# Patient Record
Sex: FEMALE | Race: WHITE | NOT HISPANIC OR LATINO | Employment: FULL TIME | URBAN - NONMETROPOLITAN AREA
[De-identification: names, ages, dates, MRNs, and addresses within clinical notes are randomized per-mention and may not be internally consistent; named-entity substitution may affect disease eponyms.]

---

## 2018-05-22 ENCOUNTER — TRANSCRIBE ORDERS (OUTPATIENT)
Dept: ADMINISTRATIVE | Facility: HOSPITAL | Age: 38
End: 2018-05-22

## 2018-05-22 ENCOUNTER — HOSPITAL ENCOUNTER (OUTPATIENT)
Dept: GENERAL RADIOLOGY | Facility: HOSPITAL | Age: 38
Discharge: HOME | End: 2018-05-22
Admitting: PHYSICIAN ASSISTANT

## 2018-05-22 DIAGNOSIS — M41.9 SCOLIOSIS, UNSPECIFIED SCOLIOSIS TYPE, UNSPECIFIED SPINAL REGION: Primary | ICD-10-CM

## 2018-05-22 PROCEDURE — 72081 X-RAY EXAM ENTIRE SPI 1 VW: CPT

## 2019-12-12 ENCOUNTER — TRANSCRIBE ORDERS (OUTPATIENT)
Dept: ADMINISTRATIVE | Facility: HOSPITAL | Age: 39
End: 2019-12-12

## 2019-12-12 DIAGNOSIS — M54.12 CERVICAL RADICULOPATHY: ICD-10-CM

## 2019-12-12 DIAGNOSIS — G89.29 CHRONIC NECK PAIN: Primary | ICD-10-CM

## 2019-12-12 DIAGNOSIS — M54.2 CHRONIC NECK PAIN: Primary | ICD-10-CM

## 2019-12-26 ENCOUNTER — HOSPITAL ENCOUNTER (OUTPATIENT)
Dept: MRI IMAGING | Facility: HOSPITAL | Age: 39
Discharge: HOME | End: 2019-12-26
Admitting: PHYSICIAN ASSISTANT

## 2019-12-26 DIAGNOSIS — G89.29 CHRONIC NECK PAIN: ICD-10-CM

## 2019-12-26 DIAGNOSIS — M54.12 CERVICAL RADICULOPATHY: ICD-10-CM

## 2019-12-26 DIAGNOSIS — M54.2 CHRONIC NECK PAIN: ICD-10-CM

## 2019-12-26 PROCEDURE — 72141 MRI NECK SPINE W/O DYE: CPT

## 2021-08-17 ENCOUNTER — LAB (OUTPATIENT)
Dept: LAB | Facility: HOSPITAL | Age: 41
End: 2021-08-17

## 2021-08-17 ENCOUNTER — OFFICE VISIT (OUTPATIENT)
Dept: GASTROENTEROLOGY | Facility: CLINIC | Age: 41
End: 2021-08-17

## 2021-08-17 VITALS
HEIGHT: 65 IN | BODY MASS INDEX: 24.83 KG/M2 | TEMPERATURE: 98.3 F | DIASTOLIC BLOOD PRESSURE: 68 MMHG | HEART RATE: 75 BPM | RESPIRATION RATE: 16 BRPM | SYSTOLIC BLOOD PRESSURE: 106 MMHG | WEIGHT: 149 LBS

## 2021-08-17 DIAGNOSIS — R11.0 NAUSEA: Chronic | ICD-10-CM

## 2021-08-17 DIAGNOSIS — K92.1 MELENA: Chronic | ICD-10-CM

## 2021-08-17 DIAGNOSIS — R10.13 EPIGASTRIC PAIN: Chronic | ICD-10-CM

## 2021-08-17 DIAGNOSIS — K21.9 GASTROESOPHAGEAL REFLUX DISEASE, UNSPECIFIED WHETHER ESOPHAGITIS PRESENT: Chronic | ICD-10-CM

## 2021-08-17 DIAGNOSIS — R19.7 DIARRHEA, UNSPECIFIED TYPE: Chronic | ICD-10-CM

## 2021-08-17 DIAGNOSIS — R10.30 LOWER ABDOMINAL PAIN: Chronic | ICD-10-CM

## 2021-08-17 DIAGNOSIS — R10.13 EPIGASTRIC PAIN: Primary | Chronic | ICD-10-CM

## 2021-08-17 PROBLEM — R10.9 ABDOMINAL PAIN: Status: ACTIVE | Noted: 2021-08-17

## 2021-08-17 PROBLEM — M41.9 CERVICAL SCOLIOSIS: Status: ACTIVE | Noted: 2021-08-17

## 2021-08-17 LAB
ALBUMIN SERPL-MCNC: 4.3 G/DL (ref 3.5–5.2)
ALBUMIN/GLOB SERPL: 1.5 G/DL
ALP SERPL-CCNC: 51 U/L (ref 39–117)
ALT SERPL W P-5'-P-CCNC: 11 U/L (ref 1–33)
ANION GAP SERPL CALCULATED.3IONS-SCNC: 8.8 MMOL/L (ref 5–15)
AST SERPL-CCNC: 12 U/L (ref 1–32)
BILIRUB SERPL-MCNC: 0.8 MG/DL (ref 0–1.2)
BUN SERPL-MCNC: 9 MG/DL (ref 6–20)
BUN/CREAT SERPL: 9 (ref 7–25)
CALCIUM SPEC-SCNC: 9.1 MG/DL (ref 8.6–10.5)
CHLORIDE SERPL-SCNC: 104 MMOL/L (ref 98–107)
CO2 SERPL-SCNC: 24.2 MMOL/L (ref 22–29)
CREAT SERPL-MCNC: 1 MG/DL (ref 0.57–1)
DEPRECATED RDW RBC AUTO: 39.4 FL (ref 37–54)
ERYTHROCYTE [DISTWIDTH] IN BLOOD BY AUTOMATED COUNT: 12 % (ref 12.3–15.4)
GFR SERPL CREATININE-BSD FRML MDRD: 61 ML/MIN/1.73
GLOBULIN UR ELPH-MCNC: 2.9 GM/DL
GLUCOSE SERPL-MCNC: 87 MG/DL (ref 65–99)
HCT VFR BLD AUTO: 43.4 % (ref 34–46.6)
HGB BLD-MCNC: 14.8 G/DL (ref 12–15.9)
IGA1 MFR SER: 182 MG/DL (ref 70–400)
LIPASE SERPL-CCNC: 30 U/L (ref 13–60)
MCH RBC QN AUTO: 30.8 PG (ref 26.6–33)
MCHC RBC AUTO-ENTMCNC: 34.1 G/DL (ref 31.5–35.7)
MCV RBC AUTO: 90.2 FL (ref 79–97)
PLATELET # BLD AUTO: 203 10*3/MM3 (ref 140–450)
PMV BLD AUTO: 12.6 FL (ref 6–12)
POTASSIUM SERPL-SCNC: 4.4 MMOL/L (ref 3.5–5.2)
PROT SERPL-MCNC: 7.2 G/DL (ref 6–8.5)
RBC # BLD AUTO: 4.81 10*6/MM3 (ref 3.77–5.28)
SODIUM SERPL-SCNC: 137 MMOL/L (ref 136–145)
WBC # BLD AUTO: 4.56 10*3/MM3 (ref 3.4–10.8)

## 2021-08-17 PROCEDURE — 83690 ASSAY OF LIPASE: CPT

## 2021-08-17 PROCEDURE — 82784 ASSAY IGA/IGD/IGG/IGM EACH: CPT

## 2021-08-17 PROCEDURE — 85027 COMPLETE CBC AUTOMATED: CPT

## 2021-08-17 PROCEDURE — 83516 IMMUNOASSAY NONANTIBODY: CPT

## 2021-08-17 PROCEDURE — 80053 COMPREHEN METABOLIC PANEL: CPT

## 2021-08-17 PROCEDURE — 36415 COLL VENOUS BLD VENIPUNCTURE: CPT

## 2021-08-17 PROCEDURE — 99204 OFFICE O/P NEW MOD 45 MIN: CPT | Performed by: NURSE PRACTITIONER

## 2021-08-17 RX ORDER — DICYCLOMINE HCL 20 MG
20 TABLET ORAL 3 TIMES DAILY PRN
Qty: 30 TABLET | Refills: 1 | Status: SHIPPED | OUTPATIENT
Start: 2021-08-17 | End: 2021-11-17 | Stop reason: SDDI

## 2021-08-17 RX ORDER — PANTOPRAZOLE SODIUM 40 MG/1
TABLET, DELAYED RELEASE ORAL
Qty: 30 TABLET | Refills: 3 | Status: SHIPPED | OUTPATIENT
Start: 2021-08-17 | End: 2021-11-17 | Stop reason: SDUPTHER

## 2021-08-17 RX ORDER — ONDANSETRON 4 MG/1
4 TABLET, ORALLY DISINTEGRATING ORAL EVERY 8 HOURS PRN
Qty: 30 TABLET | Refills: 1 | Status: SHIPPED | OUTPATIENT
Start: 2021-08-17

## 2021-08-17 NOTE — PROGRESS NOTES
New Patient Consult      Date: 2021   Patient Name: Lina Quintero  MRN: [id number]  : 1980     Primary Care Provider: Sarah Souza DO    Chief Complaint   Patient presents with   • Abdominal Pain     History of Present Illness: Lina uQintero is a 40 y.o. female who is here today to establish care with Gastroenterology for abdominal pain.     She has a history of epigastric pain for the past year that has gradually worsened. Eating makes the pain worse. She has the pain 4-5 days per week. She has associated nausea that is gradually worsening as well. Eating makes nausea worse. She has vomiting once a month on average. No history of hematemesis. She had melena several months ago, but none since. She takes Ibuprofen at least once a month for menstrual pain and occasional headaches. She has been having reflux for the past few weeks that is severe. She has frequent burping. She has not had reflux in the past and is not taking anything for reflux.     She has diarrhea 3-4 times per month that can last all day and is associated with the pain and nausea. She has occasional lower abdominal cramping associated with the diarrhea. No history of rectal bleeding.     She has not had colonoscopy or EGD in the past. There is no family history of GI malignancy or IBD.    Subjective      Past Medical History:   Diagnosis Date   • Back pain    • Scoliosis    • Tattoos      Past Surgical History:   Procedure Laterality Date   • TUBAL ABDOMINAL LIGATION       Family History   Problem Relation Age of Onset   • GI problems Father    • Colon cancer Neg Hx      Social History     Socioeconomic History   • Marital status:      Spouse name: Not on file   • Number of children: Not on file   • Years of education: Not on file   • Highest education level: Not on file   Tobacco Use   • Smoking status: Former Smoker     Quit date:      Years since quittin.6   • Smokeless tobacco: Never Used   Vaping Use   •  Vaping Use: Never used   Substance and Sexual Activity   • Alcohol use: Never   • Drug use: Never   • Sexual activity: Defer       Current Outpatient Medications:   •  Ginger, Zingiber officinalis, (GINGER PO), Take  by mouth., Disp: , Rfl:   •  dicyclomine (BENTYL) 20 MG tablet, Take 1 tablet by mouth 3 (Three) Times a Day As Needed (abdominal pain and diarrhea)., Disp: 30 tablet, Rfl: 1  •  ondansetron ODT (ZOFRAN-ODT) 4 MG disintegrating tablet, Place 1 tablet on the tongue Every 8 (Eight) Hours As Needed for Nausea or Vomiting., Disp: 30 tablet, Rfl: 1  •  pantoprazole (PROTONIX) 40 MG EC tablet, 1 po daily in the am 30 minutes before breakfast, Disp: 30 tablet, Rfl: 3    No Known Allergies     The following portions of the patient's history were reviewed and updated as appropriate: allergies, current medications, past family history, past medical history, past social history, past surgical history and problem list.    Objective     Physical Exam  Vitals and nursing note reviewed.   Constitutional:       General: She is not in acute distress.     Appearance: Normal appearance. She is well-developed. She is not diaphoretic.   HENT:      Head: Normocephalic and atraumatic.      Right Ear: Hearing and external ear normal.      Left Ear: Hearing and external ear normal.      Nose: Nose normal.      Mouth/Throat:      Mouth: Mucous membranes are not pale, not dry and not cyanotic.   Eyes:      General: Lids are normal.         Right eye: No discharge.         Left eye: No discharge.      Conjunctiva/sclera: Conjunctivae normal.   Neck:      Trachea: Trachea normal.   Cardiovascular:      Rate and Rhythm: Normal rate and regular rhythm.      Heart sounds: Normal heart sounds.   Pulmonary:      Effort: Pulmonary effort is normal. No respiratory distress.      Breath sounds: Normal breath sounds.   Chest:      Chest wall: No tenderness.   Abdominal:      General: Bowel sounds are normal. There is no distension.       "Palpations: Abdomen is soft. There is no mass.      Tenderness: There is abdominal tenderness in the epigastric area. There is no guarding or rebound.      Hernia: No hernia is present.   Musculoskeletal:      Cervical back: No edema.   Skin:     General: Skin is warm and dry.      Coloration: Skin is not pale.      Findings: No rash.   Neurological:      Mental Status: She is alert and oriented to person, place, and time.      Cranial Nerves: No cranial nerve deficit.   Psychiatric:         Mood and Affect: Mood normal.         Speech: Speech normal.         Behavior: Behavior is cooperative.       Vitals:    08/17/21 1015   BP: 106/68   Pulse: 75   Resp: 16   Temp: 98.3 °F (36.8 °C)   Weight: 67.6 kg (149 lb)   Height: 165.1 cm (65\")     Results Review:   I have reviewed the patient's new clinical and imaging results.    No visits with results within 90 Day(s) from this visit.   Latest known visit with results is:   No results found for any previous visit.       Abdominal ultrasound dated 6/23/2021   Unremarkable right upper quadrant ultrasound.    Nuclear medicine hepatobiliary scan dated 6/30/2021  Normal ejection fraction of 84%    Assessment / Plan      1. Epigastric pain  2. Nausea  3. Melena  History of epigastric pain with nausea for the past year that has gradually worsened.  She had a few episodes of melena a few months ago, none recently.  Abdominal ultrasound unremarkable.  HIDA scan with normal ejection fraction of 84%. Suspect gastritis versus gastric ulcer.  Irritable bowel syndrome with diarrhea remains a differential.  Labs  Pantoprazole 40 mg 1 p.o. daily x 3 months.  Zofran 4 mg 1 p.o. every 8 hours as needed for nausea and vomiting.  EGD in the future if no improvement or symptoms worsen.    - CBC (No Diff); Future  - Comprehensive Metabolic Panel; Future  - Lipase; Future  - IgA; Future  - Tissue Transglutaminase, IgA; Future  - ondansetron ODT (ZOFRAN-ODT) 4 MG disintegrating tablet; Place 1 " tablet on the tongue Every 8 (Eight) Hours As Needed for Nausea or Vomiting.  Dispense: 30 tablet; Refill: 1    4. Gastroesophageal reflux disease, unspecified whether esophagitis present  History of reflux for the past few weeks that is severe.  She has frequent burping.  She is not taking anything for reflux.  No difficulty swallowing.  Antireflux measures.  Pantoprazole 40 mg 1 p.o. daily x 3 months    - pantoprazole (PROTONIX) 40 MG EC tablet; 1 po daily in the am 30 minutes before breakfast  Dispense: 30 tablet; Refill: 3    5. Diarrhea, unspecified type  6. Lower abdominal pain  History of diarrhea with lower abdominal cramping for the past year that will come and go.  No history of rectal bleeding. Suspect irritable bowel syndrome with diarrhea.  Low FODMAP diet.  Bentyl 20 mg 1 p.o. 3 times a day as needed for abdominal pain and diarrhea.  Possible colonoscopy in the future if symptoms worsen or develops rectal bleeding.    - dicyclomine (BENTYL) 20 MG tablet; Take 1 tablet by mouth 3 (Three) Times a Day As Needed (abdominal pain and diarrhea).  Dispense: 30 tablet; Refill: 1    Patient Instructions   Antireflux measures: Avoid fried, fatty foods, alcohol, chocolate, coffee, tea,  soft drinks, peppermint and spearmint, spicy foods, tomatoes and tomato based foods, onions, peppers, and smoking.   Other antireflux measures include weight reduction if overweight, avoiding tight clothing around the abdomen, elevating the head of the bed 6 inches with blocks under the head board, and don't drink or eat before going to bed and avoid lying down immediately after meals.  Pantoprazole 40 mg 1 by mouth in the am 30 minutes before breakfast.  Zofran 4 mg 1 po every 8 hours as needed for nausea.  Bentyl 20 mg 1 po 3 times per day as needed for abdominal pain and diarrhea.   Low FODMAP diet - avoid dairy.  Labs  Follow up: 3 months or sooner if symptoms worsen    Low-FODMAP Eating Plan    FODMAPs (fermentable  oligosaccharides, disaccharides, monosaccharides, and polyols) are sugars that are hard for some people to digest. A low-FODMAP eating plan may help some people who have bowel (intestinal) diseases to manage their symptoms.  This meal plan can be complicated to follow. Work with a diet and nutrition specialist (dietitian) to make a low-FODMAP eating plan that is right for you. A dietitian can make sure that you get enough nutrition from this diet.  What are tips for following this plan?  Reading food labels  · Check labels for hidden FODMAPs such as:  ? High-fructose syrup.  ? Honey.  ? Agave.  ? Natural fruit flavors.  ? Onion or garlic powder.  · Choose low-FODMAP foods that contain 3-4 grams of fiber per serving.  · Check food labels for serving sizes. Eat only one serving at a time to make sure FODMAP levels stay low.  Meal planning  · Follow a low-FODMAP eating plan for up to 6 weeks, or as told by your health care provider or dietitian.  · To follow the eating plan:  1. Eliminate high-FODMAP foods from your diet completely.  2. Gradually reintroduce high-FODMAP foods into your diet one at a time. Most people should wait a few days after introducing one high-FODMAP food before they introduce the next high-FODMAP food. Your dietitian can recommend how quickly you may reintroduce foods.  3. Keep a daily record of what you eat and drink, and make note of any symptoms that you have after eating.  4. Review your daily record with a dietitian regularly. Your dietitian can help you identify which foods you can eat and which foods you should avoid.  General tips  · Drink enough fluid each day to keep your urine pale yellow.  · Avoid processed foods. These often have added sugar and may be high in FODMAPs.  · Avoid most dairy products, whole grains, and sweeteners.  · Work with a dietitian to make sure you get enough fiber in your diet.  Recommended foods  Grains  · Gluten-free grains, such as rice, oats, buckwheat,  "quinoa, corn, polenta, and millet. Gluten-free pasta, bread, or cereal. Rice noodles. Corn tortillas.  Vegetables  · Eggplant, zucchini, cucumber, peppers, green beans, [place] sprouts, bean sprouts, lettuce, arugula, kale, Swiss chard, spinach, brandie greens, bok jhonny, summer squash, potato, and tomato. Limited amounts of corn, carrot, and sweet potato. Green parts of scallions.  Fruits  · Bananas, oranges, garcia, limes, blueberries, raspberries, strawberries, grapes, cantaloupe, honeydew melon, kiwi, papaya, passion fruit, and pineapple. Limited amounts of dried cranberries, banana chips, and shredded coconut.  Dairy  · Lactose-free milk, yogurt, and kefir. Lactose-free cottage cheese and ice cream. Non-dairy milks, such as almond, coconut, hemp, and rice milk. Yogurts made of non-dairy milks. Limited amounts of goat cheese, brie, mozzarella, parmesan, swiss, and other hard cheeses.  Meats and other protein foods  · Unseasoned beef, pork, poultry, or fish. Eggs. Roblero. Tofu (firm) and tempeh. Limited amounts of nuts and seeds, such as almonds, walnuts, brazil nuts, pecans, peanuts, pumpkin seeds, lily seeds, and sunflower seeds.  Fats and oils  · Butter-free spreads. Vegetable oils, such as olive, canola, and sunflower oil.  Seasoning and other foods  · Artificial sweeteners with names that do not end in \"ol\" such as aspartame, saccharine, and stevia. Maple syrup, white table sugar, raw sugar, brown sugar, and molasses. Fresh basil, coriander, parsley, rosemary, and thyme.  Beverages  · Water and mineral water. Sugar-sweetened soft drinks. Small amounts of orange juice or cranberry juice. Black and green tea. Most dry emiliano. Coffee.  This may not be a complete list of low-FODMAP foods. Talk with your dietitian for more information.  Foods to avoid  Grains  · Wheat, including kamut, durum, and semolina. Barley and bulgur. Couscous. Wheat-based cereals. Wheat noodles, bread, crackers, and " pastries.  Vegetables  · Chicory root, artichoke, asparagus, cabbage, snow peas, sugar snap peas, mushrooms, and cauliflower. Onions, garlic, leeks, and the white part of scallions.  Fruits  · Fresh, dried, and juiced forms of apple, pear, watermelon, peach, plum, cherries, apricots, blackberries, boysenberries, figs, nectarines, and binta. Avocado.  Dairy  · Milk, yogurt, ice cream, and soft cheese. Cream and sour cream. Milk-based sauces. Custard.  Meats and other protein foods  · Fried or fatty meat. Sausage. Cashews and pistachios. Soybeans, baked beans, black beans, chickpeas, kidney beans, mehrdad beans, navy beans, lentils, and split peas.  Seasoning and other foods  · Any sugar-free gum or candy. Foods that contain artificial sweeteners such as sorbitol, mannitol, isomalt, or xylitol. Foods that contain honey, high-fructose corn syrup, or agave. Bouillon, vegetable stock, beef stock, and chicken stock. Garlic and onion powder. Condiments made with onion, such as hummus, chutney, pickles, relish, salad dressing, and salsa. Tomato paste.  Beverages  · Chicory-based drinks. Coffee substitutes. Chamomile tea. Fennel tea. Sweet or fortified emiliano such as port or selam. Diet soft drinks made with isomalt, mannitol, maltitol, sorbitol, or xylitol. Apple, pear, and binta juice. Juices with high-fructose corn syrup.  This may not be a complete list of high-FODMAP foods. Talk with your dietitian to discuss what dietary choices are best for you.   Summary  · A low-FODMAP eating plan is a short-term diet that eliminates FODMAPs from your diet to help ease symptoms of certain bowel diseases.  · The eating plan usually lasts up to 6 weeks. After that, high-FODMAP foods are restarted gradually, one at a time, so you can find out which may be causing symptoms.  · A low-FODMAP eating plan can be complicated. It is best to work with a dietitian who has experience with this type of plan.  This information is not intended to  replace advice given to you by your health care provider. Make sure you discuss any questions you have with your health care provider.  Document Revised: 11/30/2018 Document Reviewed: 08/14/2018  Elsevier Patient Education © 2021 [employer]     Joseph Saucedo, APRN  8/17/2021    Please note that portions of this note may have been completed with a voice recognition program. Efforts were made to edit the dictations, but occasionally words are mistranscribed.

## 2021-08-17 NOTE — PATIENT INSTRUCTIONS
Antireflux measures: Avoid fried, fatty foods, alcohol, chocolate, coffee, tea,  soft drinks, peppermint and spearmint, spicy foods, tomatoes and tomato based foods, onions, peppers, and smoking.   Other antireflux measures include weight reduction if overweight, avoiding tight clothing around the abdomen, elevating the head of the bed 6 inches with blocks under the head board, and don't drink or eat before going to bed and avoid lying down immediately after meals.  Pantoprazole 40 mg 1 by mouth in the am 30 minutes before breakfast.  Zofran 4 mg 1 po every 8 hours as needed for nausea.  Bentyl 20 mg 1 po 3 times per day as needed for abdominal pain and diarrhea.   Low FODMAP diet - avoid dairy.  Labs  Follow up: 3 months or sooner if symptoms worsen    Low-FODMAP Eating Plan    FODMAPs (fermentable oligosaccharides, disaccharides, monosaccharides, and polyols) are sugars that are hard for some people to digest. A low-FODMAP eating plan may help some people who have bowel (intestinal) diseases to manage their symptoms.  This meal plan can be complicated to follow. Work with a diet and nutrition specialist (dietitian) to make a low-FODMAP eating plan that is right for you. A dietitian can make sure that you get enough nutrition from this diet.  What are tips for following this plan?  Reading food labels  · Check labels for hidden FODMAPs such as:  ? High-fructose syrup.  ? Honey.  ? Agave.  ? Natural fruit flavors.  ? Onion or garlic powder.  · Choose low-FODMAP foods that contain 3-4 grams of fiber per serving.  · Check food labels for serving sizes. Eat only one serving at a time to make sure FODMAP levels stay low.  Meal planning  · Follow a low-FODMAP eating plan for up to 6 weeks, or as told by your health care provider or dietitian.  · To follow the eating plan:  1. Eliminate high-FODMAP foods from your diet completely.  2. Gradually reintroduce high-FODMAP foods into your diet one at a time. Most people should  wait a few days after introducing one high-FODMAP food before they introduce the next high-FODMAP food. Your dietitian can recommend how quickly you may reintroduce foods.  3. Keep a daily record of what you eat and drink, and make note of any symptoms that you have after eating.  4. Review your daily record with a dietitian regularly. Your dietitian can help you identify which foods you can eat and which foods you should avoid.  General tips  · Drink enough fluid each day to keep your urine pale yellow.  · Avoid processed foods. These often have added sugar and may be high in FODMAPs.  · Avoid most dairy products, whole grains, and sweeteners.  · Work with a dietitian to make sure you get enough fiber in your diet.  Recommended foods  Grains  · Gluten-free grains, such as rice, oats, buckwheat, quinoa, corn, polenta, and millet. Gluten-free pasta, bread, or cereal. Rice noodles. Corn tortillas.  Vegetables  · Eggplant, zucchini, cucumber, peppers, green beans, [place] sprouts, bean sprouts, lettuce, arugula, kale, Swiss chard, spinach, brandie greens, bok jhonny, summer squash, potato, and tomato. Limited amounts of corn, carrot, and sweet potato. Green parts of scallions.  Fruits  · Bananas, oranges, garcia, limes, blueberries, raspberries, strawberries, grapes, cantaloupe, honeydew melon, kiwi, papaya, passion fruit, and pineapple. Limited amounts of dried cranberries, banana chips, and shredded coconut.  Dairy  · Lactose-free milk, yogurt, and kefir. Lactose-free cottage cheese and ice cream. Non-dairy milks, such as almond, coconut, hemp, and rice milk. Yogurts made of non-dairy milks. Limited amounts of goat cheese, brie, mozzarella, parmesan, swiss, and other hard cheeses.  Meats and other protein foods  · Unseasoned beef, pork, poultry, or fish. Eggs. Roblero. Tofu (firm) and tempeh. Limited amounts of nuts and seeds, such as almonds, walnuts, brazil nuts, pecans, peanuts, pumpkin seeds, lily seeds, and  "sunflower seeds.  Fats and oils  · Butter-free spreads. Vegetable oils, such as olive, canola, and sunflower oil.  Seasoning and other foods  · Artificial sweeteners with names that do not end in \"ol\" such as aspartame, saccharine, and stevia. Maple syrup, white table sugar, raw sugar, brown sugar, and molasses. Fresh basil, coriander, parsley, rosemary, and thyme.  Beverages  · Water and mineral water. Sugar-sweetened soft drinks. Small amounts of orange juice or cranberry juice. Black and green tea. Most dry emiliano. Coffee.  This may not be a complete list of low-FODMAP foods. Talk with your dietitian for more information.  Foods to avoid  Grains  · Wheat, including kamut, durum, and semolina. Barley and bulgur. Couscous. Wheat-based cereals. Wheat noodles, bread, crackers, and pastries.  Vegetables  · Chicory root, artichoke, asparagus, cabbage, snow peas, sugar snap peas, mushrooms, and cauliflower. Onions, garlic, leeks, and the white part of scallions.  Fruits  · Fresh, dried, and juiced forms of apple, pear, watermelon, peach, plum, cherries, apricots, blackberries, boysenberries, figs, nectarines, and binta. Avocado.  Dairy  · Milk, yogurt, ice cream, and soft cheese. Cream and sour cream. Milk-based sauces. Custard.  Meats and other protein foods  · Fried or fatty meat. Sausage. Cashews and pistachios. Soybeans, baked beans, black beans, chickpeas, kidney beans, mehrdad beans, navy beans, lentils, and split peas.  Seasoning and other foods  · Any sugar-free gum or candy. Foods that contain artificial sweeteners such as sorbitol, mannitol, isomalt, or xylitol. Foods that contain honey, high-fructose corn syrup, or agave. Bouillon, vegetable stock, beef stock, and chicken stock. Garlic and onion powder. Condiments made with onion, such as hummus, chutney, pickles, relish, salad dressing, and salsa. Tomato paste.  Beverages  · Chicory-based drinks. Coffee substitutes. Chamomile tea. Fennel tea. Sweet or fortified " emiliano such as port or selam. Diet soft drinks made with isomalt, mannitol, maltitol, sorbitol, or xylitol. Apple, pear, and binta juice. Juices with high-fructose corn syrup.  This may not be a complete list of high-FODMAP foods. Talk with your dietitian to discuss what dietary choices are best for you.   Summary  · A low-FODMAP eating plan is a short-term diet that eliminates FODMAPs from your diet to help ease symptoms of certain bowel diseases.  · The eating plan usually lasts up to 6 weeks. After that, high-FODMAP foods are restarted gradually, one at a time, so you can find out which may be causing symptoms.  · A low-FODMAP eating plan can be complicated. It is best to work with a dietitian who has experience with this type of plan.  This information is not intended to replace advice given to you by your health care provider. Make sure you discuss any questions you have with your health care provider.  Document Revised: 11/30/2018 Document Reviewed: 08/14/2018  Elsevier Patient Education © 2021 [employer]

## 2021-08-18 LAB — TTG IGA SER-ACNC: <2 U/ML (ref 0–3)

## 2021-11-17 ENCOUNTER — OFFICE VISIT (OUTPATIENT)
Dept: GASTROENTEROLOGY | Facility: CLINIC | Age: 41
End: 2021-11-17

## 2021-11-17 VITALS
BODY MASS INDEX: 24.49 KG/M2 | SYSTOLIC BLOOD PRESSURE: 115 MMHG | HEART RATE: 82 BPM | RESPIRATION RATE: 16 BRPM | HEIGHT: 65 IN | WEIGHT: 147 LBS | TEMPERATURE: 98.4 F | DIASTOLIC BLOOD PRESSURE: 74 MMHG

## 2021-11-17 DIAGNOSIS — Z01.812 PRE-PROCEDURE LAB EXAM: Primary | ICD-10-CM

## 2021-11-17 DIAGNOSIS — K21.9 GASTROESOPHAGEAL REFLUX DISEASE, UNSPECIFIED WHETHER ESOPHAGITIS PRESENT: Chronic | ICD-10-CM

## 2021-11-17 DIAGNOSIS — R63.0 LOSS OF APPETITE: Chronic | ICD-10-CM

## 2021-11-17 DIAGNOSIS — R19.7 DIARRHEA, UNSPECIFIED TYPE: Chronic | ICD-10-CM

## 2021-11-17 DIAGNOSIS — R10.30 LOWER ABDOMINAL PAIN: Chronic | ICD-10-CM

## 2021-11-17 DIAGNOSIS — R10.13 EPIGASTRIC PAIN: Primary | Chronic | ICD-10-CM

## 2021-11-17 DIAGNOSIS — K92.1 MELENA: Chronic | ICD-10-CM

## 2021-11-17 DIAGNOSIS — R11.0 NAUSEA: Chronic | ICD-10-CM

## 2021-11-17 DIAGNOSIS — K58.0 IRRITABLE BOWEL SYNDROME WITH DIARRHEA: ICD-10-CM

## 2021-11-17 PROCEDURE — 99214 OFFICE O/P EST MOD 30 MIN: CPT | Performed by: NURSE PRACTITIONER

## 2021-11-17 RX ORDER — PANTOPRAZOLE SODIUM 40 MG/1
TABLET, DELAYED RELEASE ORAL
Qty: 30 TABLET | Refills: 3 | Status: SHIPPED | OUTPATIENT
Start: 2021-11-17 | End: 2021-12-16 | Stop reason: SDUPTHER

## 2021-11-17 RX ORDER — DICYCLOMINE HCL 20 MG
20 TABLET ORAL 3 TIMES DAILY PRN
Qty: 30 TABLET | Refills: 1
Start: 2021-11-17

## 2021-11-17 RX ORDER — SODIUM CHLORIDE 9 MG/ML
70 INJECTION, SOLUTION INTRAVENOUS CONTINUOUS PRN
Status: CANCELLED | OUTPATIENT
Start: 2021-11-17

## 2021-11-17 NOTE — PROGRESS NOTES
Follow Up Note     Date: 2021   Patient Name: Lina Quintero  MRN: [id number]  : 1980     Primary Care Provider: Sarah Souza DO     Chief Complaint   Patient presents with   • Follow-up   • Abdominal Pain     History of present illness:   2021  Lina Quintero is a 41 y.o. female who is here today for follow up for abdominal pain.     She had been feeling better, but a few weeks ago, she had an episode of severe burping with epigastric pain and nausea that lasted a day or two. She is taking Pantoprazole daily but it did not help much for those symptoms at that time, although it is controlling reflux. She has lost her appetite over the past month or so and has to make herself eat. She has lost about 2 pounds since her last visit.     The lower abdominal pain and diarrhea has improved. She has Bentyl at home, but she has not needed to take it. Denies rectal bleeding.     Interval History:  2021  She has a history of epigastric pain for the past year that has gradually worsened. Eating makes the pain worse. She has the pain 4-5 days per week. She has associated nausea that is gradually worsening as well. Eating makes nausea worse. She has vomiting once a month on average. No history of hematemesis. She had melena several months ago, but none since. She takes Ibuprofen at least once a month for menstrual pain and occasional headaches. She has been having reflux for the past few weeks that is severe. She has frequent burping. She has not had reflux in the past and is not taking anything for reflux.      She has diarrhea 3-4 times per month that can last all day and is associated with the pain and nausea. She has occasional lower abdominal cramping associated with the diarrhea. No history of rectal bleeding.      She has not had colonoscopy or EGD in the past. There is no family history of GI malignancy or IBD.    Subjective      Past Medical History:   Diagnosis Date   • Back pain    •  Scoliosis    • Tattoos      Past Surgical History:   Procedure Laterality Date   • TUBAL ABDOMINAL LIGATION       Family History   Problem Relation Age of Onset   • GI problems Father    • Colon cancer Neg Hx      Social History     Socioeconomic History   • Marital status:    Tobacco Use   • Smoking status: Former Smoker     Quit date:      Years since quittin.8   • Smokeless tobacco: Never Used   Vaping Use   • Vaping Use: Never used   Substance and Sexual Activity   • Alcohol use: Never   • Drug use: Never   • Sexual activity: Defer       Current Outpatient Medications:   •  Ginger, Zingiber officinalis, (GINGER PO), Take  by mouth., Disp: , Rfl:   •  ondansetron ODT (ZOFRAN-ODT) 4 MG disintegrating tablet, Place 1 tablet on the tongue Every 8 (Eight) Hours As Needed for Nausea or Vomiting., Disp: 30 tablet, Rfl: 1  •  pantoprazole (PROTONIX) 40 MG EC tablet, 1 po daily in the am 30 minutes before breakfast, Disp: 30 tablet, Rfl: 3  •  dicyclomine (BENTYL) 20 MG tablet, Take 1 tablet by mouth 3 (Three) Times a Day As Needed (abdominal pain and diarrhea)., Disp: 30 tablet, Rfl: 1     No Known Allergies     The following portions of the patient's history were reviewed and updated as appropriate: allergies, current medications, past family history, past medical history, past social history, past surgical history and problem list.  Objective     Physical Exam  Vitals and nursing note reviewed.   Constitutional:       General: She is not in acute distress.     Appearance: Normal appearance. She is well-developed.   HENT:      Head: Normocephalic and atraumatic.      Right Ear: Hearing normal.      Left Ear: Hearing normal.      Mouth/Throat:      Mouth: Mucous membranes are not pale, not dry and not cyanotic.   Eyes:      General: Lids are normal.      Conjunctiva/sclera: Conjunctivae normal.   Neck:      Trachea: Trachea normal.   Cardiovascular:      Rate and Rhythm: Normal rate and regular rhythm.  "     Heart sounds: Normal heart sounds.   Pulmonary:      Effort: Pulmonary effort is normal. No respiratory distress.      Breath sounds: Normal breath sounds.   Abdominal:      General: Bowel sounds are normal.      Palpations: Abdomen is soft. There is no mass.      Tenderness: There is no abdominal tenderness.      Hernia: No hernia is present.   Skin:     General: Skin is warm and dry.   Neurological:      Mental Status: She is alert and oriented to person, place, and time.   Psychiatric:         Mood and Affect: Mood normal.         Speech: Speech normal.         Behavior: Behavior normal. Behavior is cooperative.       Vitals:    11/17/21 1036   BP: 115/74   Pulse: 82   Resp: 16   Temp: 98.4 °F (36.9 °C)   Weight: 66.7 kg (147 lb)   Height: 165.1 cm (65\")     Body mass index is 24.46 kg/m².     Results Review:   I reviewed the patient's new clinical results.    No visits with results within 90 Day(s) from this visit.   Latest known visit with results is:   Lab on 08/17/2021   Component Date Value Ref Range Status   • Lipase 08/17/2021 30  13 - 60 U/L Final   • WBC 08/17/2021 4.56  3.40 - 10.80 10*3/mm3 Final   • RBC 08/17/2021 4.81  3.77 - 5.28 10*6/mm3 Final   • Hemoglobin 08/17/2021 14.8  12.0 - 15.9 g/dL Final   • Hematocrit 08/17/2021 43.4  34.0 - 46.6 % Final   • MCV 08/17/2021 90.2  79.0 - 97.0 fL Final   • MCH 08/17/2021 30.8  26.6 - 33.0 pg Final   • MCHC 08/17/2021 34.1  31.5 - 35.7 g/dL Final   • RDW 08/17/2021 12.0* 12.3 - 15.4 % Final   • RDW-SD 08/17/2021 39.4  37.0 - 54.0 fl Final   • MPV 08/17/2021 12.6* 6.0 - 12.0 fL Final   • Platelets 08/17/2021 203  140 - 450 10*3/mm3 Final   • Glucose 08/17/2021 87  65 - 99 mg/dL Final   • BUN 08/17/2021 9  6 - 20 mg/dL Final   • Creatinine 08/17/2021 1.00  0.57 - 1.00 mg/dL Final   • Sodium 08/17/2021 137  136 - 145 mmol/L Final   • Potassium 08/17/2021 4.4  3.5 - 5.2 mmol/L Final   • Chloride 08/17/2021 104  98 - 107 mmol/L Final   • CO2 08/17/2021 24.2  " 22.0 - 29.0 mmol/L Final   • Calcium 08/17/2021 9.1  8.6 - 10.5 mg/dL Final   • Total Protein 08/17/2021 7.2  6.0 - 8.5 g/dL Final   • Albumin 08/17/2021 4.30  3.50 - 5.20 g/dL Final   • ALT (SGPT) 08/17/2021 11  1 - 33 U/L Final   • AST (SGOT) 08/17/2021 12  1 - 32 U/L Final   • Alkaline Phosphatase 08/17/2021 51  39 - 117 U/L Final   • Total Bilirubin 08/17/2021 0.8  0.0 - 1.2 mg/dL Final   • eGFR Non  Amer 08/17/2021 61  >60 mL/min/1.73 Final   • Globulin 08/17/2021 2.9  gm/dL Final   • A/G Ratio 08/17/2021 1.5  g/dL Final   • BUN/Creatinine Ratio 08/17/2021 9.0  7.0 - 25.0 Final   • Anion Gap 08/17/2021 8.8  5.0 - 15.0 mmol/L Final   • IgA 08/17/2021 182  70 - 400 mg/dL Final   • Tissue Transglutaminase IgA 08/17/2021 <2  0 - 3 U/mL Final                                  Negative        0 -  3                                Weak Positive   4 - 10                                Positive           >10   Tissue Transglutaminase (tTG) has been identified   as the endomysial antigen.  Studies have demonstr-   ated that endomysial IgA antibodies have over 99%   specificity for gluten sensitive enteropathy.      No radiology results for the last 90 days.     Abdominal ultrasound dated 6/23/2021   Unremarkable right upper quadrant ultrasound.     Nuclear medicine hepatobiliary scan dated 6/30/2021  Normal ejection fraction of 84%    Assessment / Plan      1. Epigastric pain  2. Nausea  3. Loss of appetite  4. Melena  Epigastric pain and nausea had improved after taking Pantoprazole daily, but a few weeks ago she had an episode of severe epigastric pain, nausea, and burping that lasted a day or 2.  Symptoms were severe.  She had one episode of black stool during that time, none since. Denies NSAID use. Basic labs unremarkable, no anemia. Celiac panel negative. Lipase normal.   Continue Pantoprazole 40 mg daily for now.   EGD to rule out peptic ulcer disease.    - Case Request; Standing  - Case  Request    8/17/2021  History of epigastric pain with nausea for the past year that has gradually worsened.  She had a few episodes of melena a few months ago, none recently.  Abdominal ultrasound unremarkable.  HIDA scan with normal ejection fraction of 84%.  Irritable bowel syndrome with diarrhea remains a differential.  Labs  Pantoprazole 40 mg 1 p.o. daily x 3 months.  Zofran 4 mg 1 p.o. every 8 hours as needed for nausea and vomiting.  EGD in the future if no improvement or symptoms worsen.    5. Gastroesophageal reflux disease, unspecified whether esophagitis present  Reflux reasonably controlled with pantoprazole 40 mg daily.  Continue same for now.  No difficulty swallowing.  Antireflux measures.  If EGD unremarkable, consider decreasing to Pantoprazole 20 mg daily/as needed.    8/17/2021  History of reflux for the past few weeks that is severe.  She has frequent burping.  She is not taking anything for reflux.  No difficulty swallowing.  Antireflux measures.  Pantoprazole 40 mg 1 p.o. daily x 3 months    6. Diarrhea, unspecified type  7. Lower abdominal pain  8. Irritable bowel syndrome with diarrhea  Diarrhea lower abdominal pain improved.  No rectal bleeding.  Basic labs unremarkable.  Celiac panel negative.  Continue Bentyl 20 mg 3 times per day as needed.   Avoid dairy.   Colonoscopy in the future if symptoms worsen or develops rectal bleeding.    8/17/2021  History of diarrhea with lower abdominal cramping for the past year that will come and go.  No history of rectal bleeding. Suspect irritable bowel syndrome with diarrhea.  Low FODMAP diet.  Bentyl 20 mg 1 p.o. 3 times a day as needed for abdominal pain and diarrhea.  Possible colonoscopy in the future if symptoms worsen or develops rectal bleeding.    Patient Instructions   Antireflux measures: Avoid fried, fatty foods, alcohol, chocolate, coffee, tea,  soft drinks, peppermint and spearmint, spicy foods, tomatoes and tomato based foods, onions,  peppers, and smoking.   Other antireflux measures include weight reduction if overweight, avoiding tight clothing around the abdomen, elevating the head of the bed 6 inches with blocks under the head board, and don't drink or eat before going to bed and avoid lying down immediately after meals.  Pantoprazole 40 mg 1 by mouth in the am 30 minutes before breakfast.  Zofran 4 mg 1 po every 8 hours as needed for nausea.  Avoid all dairy.   Bentyl 20 mg 1 po 3 times per day as needed for lower abdominal pain and diarrhea.   Upper endoscopy-EGD: The indications, technique, alternatives and potential risk and complications were discussed with the patient including but not limited to bleeding, perforations, missing lesions and anesthetic complications. The patient understands and wishes to proceed with the procedure and has given their verbal consent. Written patient education information was given to the patient.   The patient will call if they have further questions before procedure.   COVID-19 testing prior to procedure. The patient will need to self-quarantine after testing until the procedure. Instructions given to patient.    Joseph Saucedo, APRN  11/17/2021    Please note that portions of this note may have been completed with a voice recognition program. Efforts were made to edit the dictations, but occasionally words are mistranscribed.

## 2021-11-17 NOTE — PATIENT INSTRUCTIONS
Antireflux measures: Avoid fried, fatty foods, alcohol, chocolate, coffee, tea,  soft drinks, peppermint and spearmint, spicy foods, tomatoes and tomato based foods, onions, peppers, and smoking.   Other antireflux measures include weight reduction if overweight, avoiding tight clothing around the abdomen, elevating the head of the bed 6 inches with blocks under the head board, and don't drink or eat before going to bed and avoid lying down immediately after meals.  Pantoprazole 40 mg 1 by mouth in the am 30 minutes before breakfast.  Zofran 4 mg 1 po every 8 hours as needed for nausea.  Avoid all dairy.   Bentyl 20 mg 1 po 3 times per day as needed for lower abdominal pain and diarrhea.   Upper endoscopy-EGD: The indications, technique, alternatives and potential risk and complications were discussed with the patient including but not limited to bleeding, perforations, missing lesions and anesthetic complications. The patient understands and wishes to proceed with the procedure and has given their verbal consent. Written patient education information was given to the patient.   The patient will call if they have further questions before procedure.   COVID-19 testing prior to procedure. The patient will need to self-quarantine after testing until the procedure. Instructions given to patient.

## 2021-12-16 DIAGNOSIS — K21.9 GASTROESOPHAGEAL REFLUX DISEASE, UNSPECIFIED WHETHER ESOPHAGITIS PRESENT: Chronic | ICD-10-CM

## 2021-12-16 RX ORDER — PANTOPRAZOLE SODIUM 40 MG/1
TABLET, DELAYED RELEASE ORAL
Qty: 30 TABLET | Refills: 3 | Status: SHIPPED | OUTPATIENT
Start: 2021-12-16 | End: 2022-04-25 | Stop reason: SDUPTHER

## 2022-02-28 ENCOUNTER — ANESTHESIA EVENT (OUTPATIENT)
Dept: GASTROENTEROLOGY | Facility: HOSPITAL | Age: 42
End: 2022-02-28

## 2022-02-28 ENCOUNTER — HOSPITAL ENCOUNTER (OUTPATIENT)
Facility: HOSPITAL | Age: 42
Setting detail: HOSPITAL OUTPATIENT SURGERY
Discharge: HOME | End: 2022-02-28
Attending: INTERNAL MEDICINE | Admitting: INTERNAL MEDICINE

## 2022-02-28 ENCOUNTER — ANESTHESIA (OUTPATIENT)
Dept: GASTROENTEROLOGY | Facility: HOSPITAL | Age: 42
End: 2022-02-28

## 2022-02-28 VITALS
HEART RATE: 65 BPM | BODY MASS INDEX: 21.66 KG/M2 | OXYGEN SATURATION: 100 % | RESPIRATION RATE: 16 BRPM | SYSTOLIC BLOOD PRESSURE: 112 MMHG | DIASTOLIC BLOOD PRESSURE: 58 MMHG | HEIGHT: 65 IN | TEMPERATURE: 97.6 F | WEIGHT: 130 LBS

## 2022-02-28 DIAGNOSIS — K92.1 MELENA: ICD-10-CM

## 2022-02-28 DIAGNOSIS — R10.13 EPIGASTRIC PAIN: ICD-10-CM

## 2022-02-28 LAB
B-HCG UR QL: NEGATIVE
EXPIRATION DATE: NORMAL
INTERNAL NEGATIVE CONTROL: NORMAL
INTERNAL POSITIVE CONTROL: NORMAL
Lab: NORMAL

## 2022-02-28 PROCEDURE — 25010000002 ONDANSETRON PER 1 MG: Performed by: NURSE ANESTHETIST, CERTIFIED REGISTERED

## 2022-02-28 PROCEDURE — 25010000002 METOCLOPRAMIDE PER 10 MG: Performed by: NURSE ANESTHETIST, CERTIFIED REGISTERED

## 2022-02-28 PROCEDURE — 81025 URINE PREGNANCY TEST: CPT | Performed by: INTERNAL MEDICINE

## 2022-02-28 PROCEDURE — 43239 EGD BIOPSY SINGLE/MULTIPLE: CPT | Performed by: INTERNAL MEDICINE

## 2022-02-28 PROCEDURE — 25010000002 PROPOFOL 10 MG/ML EMULSION: Performed by: NURSE ANESTHETIST, CERTIFIED REGISTERED

## 2022-02-28 RX ORDER — SODIUM CHLORIDE 9 MG/ML
70 INJECTION, SOLUTION INTRAVENOUS CONTINUOUS PRN
Status: DISCONTINUED | OUTPATIENT
Start: 2022-02-28 | End: 2022-02-28 | Stop reason: HOSPADM

## 2022-02-28 RX ORDER — SODIUM CHLORIDE 9 MG/ML
INJECTION, SOLUTION INTRAVENOUS CONTINUOUS PRN
Status: DISCONTINUED | OUTPATIENT
Start: 2022-02-28 | End: 2022-02-28 | Stop reason: SURG

## 2022-02-28 RX ORDER — LIDOCAINE HYDROCHLORIDE 20 MG/ML
INJECTION, SOLUTION EPIDURAL; INFILTRATION; INTRACAUDAL; PERINEURAL AS NEEDED
Status: DISCONTINUED | OUTPATIENT
Start: 2022-02-28 | End: 2022-02-28 | Stop reason: SURG

## 2022-02-28 RX ORDER — METOCLOPRAMIDE HYDROCHLORIDE 5 MG/ML
INJECTION INTRAMUSCULAR; INTRAVENOUS AS NEEDED
Status: DISCONTINUED | OUTPATIENT
Start: 2022-02-28 | End: 2022-02-28 | Stop reason: SURG

## 2022-02-28 RX ORDER — ONDANSETRON 2 MG/ML
INJECTION INTRAMUSCULAR; INTRAVENOUS AS NEEDED
Status: DISCONTINUED | OUTPATIENT
Start: 2022-02-28 | End: 2022-02-28 | Stop reason: SURG

## 2022-02-28 RX ORDER — PROPOFOL 10 MG/ML
VIAL (ML) INTRAVENOUS AS NEEDED
Status: DISCONTINUED | OUTPATIENT
Start: 2022-02-28 | End: 2022-02-28 | Stop reason: SURG

## 2022-02-28 RX ADMIN — LIDOCAINE HYDROCHLORIDE 60 MG: 20 INJECTION, SOLUTION EPIDURAL; INFILTRATION; INTRACAUDAL; PERINEURAL at 07:40

## 2022-02-28 RX ADMIN — SODIUM CHLORIDE: 9 INJECTION, SOLUTION INTRAVENOUS at 07:29

## 2022-02-28 RX ADMIN — ONDANSETRON 4 MG: 2 INJECTION INTRAMUSCULAR; INTRAVENOUS at 07:43

## 2022-02-28 RX ADMIN — PROPOFOL 200 MG: 10 INJECTION, EMULSION INTRAVENOUS at 07:40

## 2022-02-28 RX ADMIN — METOCLOPRAMIDE 10 MG: 5 INJECTION, SOLUTION INTRAMUSCULAR; INTRAVENOUS at 07:43

## 2022-02-28 NOTE — ANESTHESIA POSTPROCEDURE EVALUATION
Patient: Lina Quintero    Procedure Summary     Date: 02/28/22 Room / Location: [facility] ENDOSCOPY 1 / [facility] ENDOSCOPY    Anesthesia Start: 0729 Anesthesia Stop: 0749    Procedure: ESOPHAGOGASTRODUODENOSCOPY WITH BIOPSY (N/A Esophagus) Diagnosis:       Epigastric pain      Melena      (Epigastric pain [R10.13])      (Melena [K92.1])    Surgeons: Josette Cade MD Provider: Mariusz Min CRNA    Anesthesia Type: MAC ASA Status: 2          Anesthesia Type: MAC    Vitals  Vitals Value Taken Time   BP 94/60 02/28/22 0747   Temp 97.6 °F (36.4 °C) 02/28/22 0747   Pulse 77 02/28/22 0747   Resp 14 02/28/22 0747   SpO2 96 % 02/28/22 0747           Post Anesthesia Care and Evaluation    Patient location during evaluation: PHASE II  Patient participation: complete - patient participated  Level of consciousness: awake  Pain management: adequate  Airway patency: patent  Anesthetic complications: No anesthetic complications  PONV Status: none  Cardiovascular status: acceptable  Respiratory status: acceptable  Hydration status: acceptable  No anesthesia care post op

## 2022-02-28 NOTE — ANESTHESIA PREPROCEDURE EVALUATION
Anesthesia Evaluation     Patient summary reviewed and Nursing notes reviewed   NPO Solid Status: > 8 hours  NPO Liquid Status: > 8 hours           Airway   Mallampati: II  TM distance: >3 FB  Neck ROM: full  Possible difficult intubation  Dental      Pulmonary - normal exam   (+) a smoker Current Abstained day of surgery,   Cardiovascular - normal exam        Neuro/Psych  GI/Hepatic/Renal/Endo    (+)  GERD well controlled,      Musculoskeletal     (+) back pain,   Abdominal  - normal exam   Substance History      OB/GYN          Other                        Anesthesia Plan    ASA 2     MAC     intravenous induction     Anesthetic plan, all risks, benefits, and alternatives have been provided, discussed and informed consent has been obtained with: patient.    Plan discussed with CRNA.        CODE STATUS:

## 2022-03-03 LAB
LAB AP CASE REPORT: NORMAL
PATH REPORT.FINAL DX SPEC: NORMAL

## 2022-04-25 ENCOUNTER — OFFICE VISIT (OUTPATIENT)
Dept: GASTROENTEROLOGY | Facility: CLINIC | Age: 42
End: 2022-04-25

## 2022-04-25 VITALS
BODY MASS INDEX: 21.83 KG/M2 | HEIGHT: 65 IN | SYSTOLIC BLOOD PRESSURE: 116 MMHG | WEIGHT: 131 LBS | TEMPERATURE: 98.2 F | DIASTOLIC BLOOD PRESSURE: 64 MMHG

## 2022-04-25 DIAGNOSIS — K31.84 GASTROPARESIS: Chronic | ICD-10-CM

## 2022-04-25 DIAGNOSIS — R63.0 LOSS OF APPETITE: Chronic | ICD-10-CM

## 2022-04-25 DIAGNOSIS — R63.4 WEIGHT LOSS: Primary | Chronic | ICD-10-CM

## 2022-04-25 DIAGNOSIS — K21.9 GASTROESOPHAGEAL REFLUX DISEASE, UNSPECIFIED WHETHER ESOPHAGITIS PRESENT: Chronic | ICD-10-CM

## 2022-04-25 DIAGNOSIS — R10.13 EPIGASTRIC PAIN: Chronic | ICD-10-CM

## 2022-04-25 DIAGNOSIS — R10.30 LOWER ABDOMINAL PAIN: Chronic | ICD-10-CM

## 2022-04-25 DIAGNOSIS — R19.7 DIARRHEA, UNSPECIFIED TYPE: Chronic | ICD-10-CM

## 2022-04-25 DIAGNOSIS — R11.0 NAUSEA: Chronic | ICD-10-CM

## 2022-04-25 PROCEDURE — 99214 OFFICE O/P EST MOD 30 MIN: CPT | Performed by: NURSE PRACTITIONER

## 2022-04-25 RX ORDER — SODIUM CHLORIDE 9 MG/ML
70 INJECTION, SOLUTION INTRAVENOUS CONTINUOUS PRN
Status: CANCELLED | OUTPATIENT
Start: 2022-04-25

## 2022-04-25 RX ORDER — PEG-3350, SODIUM SULFATE, SODIUM CHLORIDE, POTASSIUM CHLORIDE, SODIUM ASCORBATE AND ASCORBIC ACID 7.5-2.691G
1000 KIT ORAL TAKE AS DIRECTED
Qty: 1 EACH | Refills: 0 | Status: SHIPPED | OUTPATIENT
Start: 2022-04-25 | End: 2022-05-14 | Stop reason: HOSPADM

## 2022-04-25 RX ORDER — PANTOPRAZOLE SODIUM 40 MG/1
TABLET, DELAYED RELEASE ORAL
Qty: 30 TABLET | Refills: 3 | Status: SHIPPED | OUTPATIENT
Start: 2022-04-25 | End: 2022-07-11 | Stop reason: DRUGHIGH

## 2022-04-25 NOTE — PROGRESS NOTES
Follow Up Note     Date: 2022   Patient Name: Lina Quintero  MRN: [id number]  : 1980     Primary Care Provider: Sarah Souza DO     Chief Complaint   Patient presents with   • Follow-up     EGD 2022   • Abdominal Pain     History of present illness:   2022  Lina Quintero is a 41 y.o. female who is here today for follow up after EGD.    She has lost 16 pounds since her visit in 2021 that is not intentional. She does not have much of an appetite, but is eating smaller portions. Diarrhea is better. Abdominal pain is a little better with Pantoprazole.     Interval History:  2021  Lina Quintero is a 41 y.o. female who is here today for follow up for abdominal pain.  She had been feeling better, but a few weeks ago, she had an episode of severe burping with epigastric pain and nausea that lasted a day or two. She is taking Pantoprazole daily but it did not help much for those symptoms at that time, although it is controlling reflux. She has lost her appetite over the past month or so and has to make herself eat. She has lost about 2 pounds since her last visit.      The lower abdominal pain and diarrhea has improved. She has Bentyl at home, but she has not needed to take it. Denies rectal bleeding.     2021  She has a history of epigastric pain for the past year that has gradually worsened. Eating makes the pain worse. She has the pain 4-5 days per week. She has associated nausea that is gradually worsening as well. Eating makes nausea worse. She has vomiting once a month on average. No history of hematemesis. She had melena several months ago, but none since. She takes Ibuprofen at least once a month for menstrual pain and occasional headaches. She has been having reflux for the past few weeks that is severe. She has frequent burping. She has not had reflux in the past and is not taking anything for reflux.      She has diarrhea 3-4 times per month that can last all  day and is associated with the pain and nausea. She has occasional lower abdominal cramping associated with the diarrhea. No history of rectal bleeding.      She has not had colonoscopy or EGD in the past. There is no family history of GI malignancy or IBD.    Subjective      Past Medical History:   Diagnosis Date   • Back pain    • COVID-19 vaccine series completed     Moderna   • Ear piercing    • Epigastric pain    • Scoliosis    • Seasonal allergies    • Tattoos      Past Surgical History:   Procedure Laterality Date   • DILATATION AND CURETTAGE     • ENDOSCOPY N/A 02/28/2022    Procedure: ESOPHAGOGASTRODUODENOSCOPY WITH BIOPSY;  Surgeon: Josette Cade MD;  Location: [facility] ENDOSCOPY;  Service: Gastroenterology;  Laterality: N/A;   • TUBAL ABDOMINAL LIGATION     • WISDOM TOOTH EXTRACTION      x2     Family History   Problem Relation Age of Onset   • GI problems Father    • Colon cancer Neg Hx    • Liver cancer Neg Hx    • Rectal cancer Neg Hx    • Stomach cancer Neg Hx      Social History     Socioeconomic History   • Marital status:    Tobacco Use   • Smoking status: Current Every Day Smoker     Packs/day: 0.50   • Smokeless tobacco: Never Used   Vaping Use   • Vaping Use: Never used   Substance and Sexual Activity   • Alcohol use: Never   • Drug use: Never   • Sexual activity: Defer       Current Outpatient Medications:   •  dicyclomine (BENTYL) 20 MG tablet, Take 1 tablet by mouth 3 (Three) Times a Day As Needed (abdominal pain and diarrhea)., Disp: 30 tablet, Rfl: 1  •  Ginger, Zingiber officinalis, (GINGER PO), Take  by mouth As Needed., Disp: , Rfl:   •  ondansetron ODT (ZOFRAN-ODT) 4 MG disintegrating tablet, Place 1 tablet on the tongue Every 8 (Eight) Hours As Needed for Nausea or Vomiting., Disp: 30 tablet, Rfl: 1  •  pantoprazole (PROTONIX) 40 MG EC tablet, 1 po daily in the am 30 minutes before breakfast, Disp: 30 tablet, Rfl: 3  •  PEG-KCl-NaCl-NaSulf-Na Asc-C (MOVIPREP) 100 g  "reconstituted solution powder, Take 1,000 mL by mouth Take As Directed., Disp: 1 each, Rfl: 0     No Known Allergies     The following portions of the patient's history were reviewed and updated as appropriate: allergies, current medications, past family history, past medical history, past social history, past surgical history and problem list.  Objective     Physical Exam  Vitals and nursing note reviewed.   Constitutional:       General: She is not in acute distress.     Appearance: Normal appearance. She is well-developed.   HENT:      Head: Normocephalic and atraumatic.      Mouth/Throat:      Mouth: Mucous membranes are not pale, not dry and not cyanotic.   Eyes:      General: Lids are normal.   Neck:      Trachea: Trachea normal.   Cardiovascular:      Rate and Rhythm: Normal rate.   Pulmonary:      Effort: Pulmonary effort is normal. No respiratory distress.      Breath sounds: Normal breath sounds.   Abdominal:      Tenderness: There is no abdominal tenderness.   Skin:     General: Skin is warm and dry.   Neurological:      Mental Status: She is alert and oriented to person, place, and time.   Psychiatric:         Mood and Affect: Mood normal.         Speech: Speech normal.         Behavior: Behavior normal. Behavior is cooperative.       Vitals:    04/25/22 1029   BP: 116/64   Temp: 98.2 °F (36.8 °C)   Weight: 59.4 kg (131 lb)   Height: 165.1 cm (65\")     Body mass index is 21.8 kg/m².     Results Review:   I reviewed the patient's new clinical results.    Admission on 02/28/2022, Discharged on 02/28/2022   Component Date Value Ref Range Status   • HCG, Urine, QL 02/28/2022 Negative  Negative Final   • Lot Number 02/28/2022 [id number]   Final   • Internal Positive Control 02/28/2022 Passed  Positive, Passed Final   • Internal Negative Control 02/28/2022 Passed  Negative, Passed Final   • Expiration Date 02/28/2022 7/31/2023   Final   • Case Report 02/28/2022    Final                    Value:Surgical " Pathology Report                         Case: [id number]                                  Authorizing Provider:  Josette Cade MD  Collected:           02/28/2022 07:41 AM          Ordering Location:     [facility]    Received:            02/28/2022 09:24 AM                                 SURG ENDO                                                                    Pathologist:           Elsa Evangelista DO                                                        Specimens:   1) - Small Intestine, BX FOR  CHRONIC LOOSE STOOL                                                   2) - Gastric, Antrum, BIOPSY FOR HPYLORI                                                  • Final Diagnosis 02/28/2022    Final                    Value:This result contains rich text formatting which cannot be displayed here.      No radiology results for the last 90 days.     Abdominal ultrasound dated 6/23/2021   Unremarkable right upper quadrant ultrasound.     Nuclear medicine hepatobiliary scan dated 6/30/2021  Normal ejection fraction of 84%    EGD dated 2/28/2022 per Dr. Cade  - Normal oropharynx.  - Z-line regular, 36 cm from the incisors.  - 1 cm hiatal hernia.  - No gross lesions in esophagus.  - Erythematous mucosa in the posterior wall of the stomach, antrum and prepyloric region of the stomach. Biopsied.  - A medium amount of food (residue) in the stomach.  - Normal duodenal bulb, first portion of the duodenum, second portion of the duodenum and third portion of the duodenum. Biopsied.  - Gastric views were inadequate due to food debris  - Findings suggestive of gastroparesis  -Small bowel biopsy unremarkable.  Gastric antrum biopsy with mild reactive gastropathy, no H. pylori.    Assessment / Plan      1. Weight loss  2. Lower abdominal pain  3. Diarrhea, unspecified type  She has lost 16 pounds since her visit in November 2021 that is not intentional.  Abdominal pain and diarrhea is a little better, but  not resolved.  EGD with finding suggestive of gastroparesis, otherwise unremarkable, no cause for weight loss.  Lipase normal.  Celiac panel negative.  Labs  CTAP to rule out solid organ pathology for weight loss  Colonoscopy to rule out IBD/other causes of unintentional weight loss    - CT Abdomen Pelvis With Contrast; Future  - CBC (No Diff); Future  - Comprehensive Metabolic Panel; Future  - TSH; Future  - Case Request; Standing  - Case Request  - PEG-KCl-NaCl-NaSulf-Na Asc-C (MOVIPREP) 100 g reconstituted solution powder; Take 1,000 mL by mouth Take As Directed.  Dispense: 1 each; Refill: 0    11/17/2021  Diarrhea lower abdominal pain improved.  No rectal bleeding.  Basic labs unremarkable.  Celiac panel negative.    8/17/2021  History of diarrhea with lower abdominal cramping for the past year that will come and go.  No history of rectal bleeding. Suspect irritable bowel syndrome with diarrhea.    4. Epigastric pain  5. Nausea  6. Loss of appetite  7. Gastroparesis  Abdominal pain and nausea is doing better, but continues to not have much of an appetite.  She is eating smaller portions.  She is taking pantoprazole 40 mg daily and it has helped with abdominal pain. Denies melena.  EGD with findings suggestive of gastroparesis, biopsies negative for H. pylori and celiac.  Advised to eat a softer diet with 4-5 very small meals throughout the day.  Continue PPI for now.  Zofran 4 mg 1 p.o. every 8 hours as needed for nausea.  Lab  CTAP to rule out solid organ pathology    - CT Abdomen Pelvis With Contrast; Future  - CBC (No Diff); Future  - Comprehensive Metabolic Panel; Future  - TSH; Future    11/17/2021  Epigastric pain and nausea had improved after taking Pantoprazole daily, but a few weeks ago she had an episode of severe epigastric pain, nausea, and burping that lasted a day or 2.  Symptoms were severe.  She had one episode of black stool during that time, none since. Denies NSAID use. Basic labs  unremarkable, no anemia. Celiac panel negative. Lipase normal.     8/17/2021  History of epigastric pain with nausea for the past year that has gradually worsened.  She had a few episodes of melena a few months ago, none recently.  Abdominal ultrasound unremarkable.  HIDA scan with normal ejection fraction of 84%.  Irritable bowel syndrome with diarrhea remains a differential.    8. Gastroesophageal reflux disease, unspecified whether esophagitis present  She has a history of reflux for several years that is severe.  She is taking pantoprazole 40 mg daily and reflux is reasonably controlled.  Denies difficulty swallowing.  EGD with findings suggestive of gastroparesis, no Leon's.  Antireflux measures.  She wishes to continue pantoprazole 40 mg daily for now.  We will decrease to pantoprazole 20 mg daily at follow-up.    - pantoprazole (PROTONIX) 40 MG EC tablet; 1 po daily in the am 30 minutes before breakfast  Dispense: 30 tablet; Refill: 3    Patient Instructions   1. Antireflux measures: Avoid fried, fatty foods, alcohol, chocolate, coffee, tea,  soft drinks, peppermint and spearmint, spicy foods, tomatoes and tomato based foods, onions, peppers, and smoking.   2. Other antireflux measures include weight reduction if overweight, avoiding tight clothing around the abdomen, elevating the head of the bed 6 inches with blocks under the head board, and don't drink or eat before going to bed and avoid lying down immediately after meals.  3. Avoid vaping/smoking.  4. Pantoprazole 40 mg 1 by mouth in the am 30 minutes before breakfast.  5. Zofran 4 mg 1 po every 8 hours as needed for nausea.   6. Low FODMAP diet - avoid all dairy.   7. Bentyl 20 mg 1 po 3 times per day as needed for lower abdominal pain and diarrhea.  8. The patient should eat 4-5 very small meals throughout the day. Avoid large meals.  9. It is recommended to eat a softer diet. Meats are best consumed ground. Fruits and vegetables are best consumed  cooked or steamed and then mashed.    10. Labs  11. CT abdomen and pelvis  12. Colonoscopy: The indications, technique, alternatives and potential risk and complications were discussed with the patient including but not limited to bleeding, perforations, missing lesions and anesthetic complications. The patient understands and wishes to proceed with the procedure and has given their verbal consent. Written patient education information was given to the patient.   13. The patient will call if they have further questions before procedure.      Joseph Saucedo, APRN  4/25/2022    Please note that portions of this note may have been completed with a voice recognition program. Efforts were made to edit the dictations, but occasionally words are mistranscribed.

## 2022-04-25 NOTE — PATIENT INSTRUCTIONS
Antireflux measures: Avoid fried, fatty foods, alcohol, chocolate, coffee, tea,  soft drinks, peppermint and spearmint, spicy foods, tomatoes and tomato based foods, onions, peppers, and smoking.   Other antireflux measures include weight reduction if overweight, avoiding tight clothing around the abdomen, elevating the head of the bed 6 inches with blocks under the head board, and don't drink or eat before going to bed and avoid lying down immediately after meals.  Avoid vaping/smoking.  Pantoprazole 40 mg 1 by mouth in the am 30 minutes before breakfast.  Zofran 4 mg 1 po every 8 hours as needed for nausea.   Low FODMAP diet - avoid all dairy.   Bentyl 20 mg 1 po 3 times per day as needed for lower abdominal pain and diarrhea.  The patient should eat 4-5 very small meals throughout the day. Avoid large meals.  It is recommended to eat a softer diet. Meats are best consumed ground. Fruits and vegetables are best consumed cooked or steamed and then mashed.    Labs  CT abdomen and pelvis  Colonoscopy: The indications, technique, alternatives and potential risk and complications were discussed with the patient including but not limited to bleeding, perforations, missing lesions and anesthetic complications. The patient understands and wishes to proceed with the procedure and has given their verbal consent. Written patient education information was given to the patient.   The patient will call if they have further questions before procedure.

## 2022-05-03 ENCOUNTER — APPOINTMENT (OUTPATIENT)
Dept: CT IMAGING | Facility: HOSPITAL | Age: 42
End: 2022-05-03

## 2022-05-09 ENCOUNTER — LAB (OUTPATIENT)
Dept: LAB | Facility: HOSPITAL | Age: 42
End: 2022-05-09

## 2022-05-09 ENCOUNTER — HOSPITAL ENCOUNTER (OUTPATIENT)
Dept: CT IMAGING | Facility: HOSPITAL | Age: 42
Discharge: HOME | End: 2022-05-09

## 2022-05-09 DIAGNOSIS — R63.4 WEIGHT LOSS: Chronic | ICD-10-CM

## 2022-05-09 DIAGNOSIS — R10.30 LOWER ABDOMINAL PAIN: Chronic | ICD-10-CM

## 2022-05-09 DIAGNOSIS — R11.0 NAUSEA: Chronic | ICD-10-CM

## 2022-05-09 DIAGNOSIS — R19.7 DIARRHEA, UNSPECIFIED TYPE: Chronic | ICD-10-CM

## 2022-05-09 LAB
ALBUMIN SERPL-MCNC: 3.9 G/DL (ref 3.5–5.2)
ALBUMIN/GLOB SERPL: 1.4 G/DL
ALP SERPL-CCNC: 56 U/L (ref 39–117)
ALT SERPL W P-5'-P-CCNC: 8 U/L (ref 1–33)
ANION GAP SERPL CALCULATED.3IONS-SCNC: 8.7 MMOL/L (ref 5–15)
AST SERPL-CCNC: 16 U/L (ref 1–32)
BILIRUB SERPL-MCNC: 0.4 MG/DL (ref 0–1.2)
BUN SERPL-MCNC: 8 MG/DL (ref 6–20)
BUN/CREAT SERPL: 9.1 (ref 7–25)
CALCIUM SPEC-SCNC: 9.3 MG/DL (ref 8.6–10.5)
CHLORIDE SERPL-SCNC: 105 MMOL/L (ref 98–107)
CO2 SERPL-SCNC: 25.3 MMOL/L (ref 22–29)
CREAT SERPL-MCNC: 0.88 MG/DL (ref 0.57–1)
DEPRECATED RDW RBC AUTO: 40.6 FL (ref 37–54)
EGFRCR SERPLBLD CKD-EPI 2021: 84.8 ML/MIN/1.73
ERYTHROCYTE [DISTWIDTH] IN BLOOD BY AUTOMATED COUNT: 12.1 % (ref 12.3–15.4)
GLOBULIN UR ELPH-MCNC: 2.7 GM/DL
GLUCOSE SERPL-MCNC: 95 MG/DL (ref 65–99)
HCT VFR BLD AUTO: 39.9 % (ref 34–46.6)
HGB BLD-MCNC: 13.4 G/DL (ref 12–15.9)
MCH RBC QN AUTO: 30.7 PG (ref 26.6–33)
MCHC RBC AUTO-ENTMCNC: 33.6 G/DL (ref 31.5–35.7)
MCV RBC AUTO: 91.5 FL (ref 79–97)
PLATELET # BLD AUTO: 199 10*3/MM3 (ref 140–450)
PMV BLD AUTO: 12.5 FL (ref 6–12)
POTASSIUM SERPL-SCNC: 3.9 MMOL/L (ref 3.5–5.2)
PROT SERPL-MCNC: 6.6 G/DL (ref 6–8.5)
RBC # BLD AUTO: 4.36 10*6/MM3 (ref 3.77–5.28)
SODIUM SERPL-SCNC: 139 MMOL/L (ref 136–145)
TSH SERPL DL<=0.05 MIU/L-ACNC: 1.47 UIU/ML (ref 0.27–4.2)
WBC NRBC COR # BLD: 6.34 10*3/MM3 (ref 3.4–10.8)

## 2022-05-09 PROCEDURE — 36415 COLL VENOUS BLD VENIPUNCTURE: CPT

## 2022-05-09 PROCEDURE — 25010000002 IOPAMIDOL 61 % SOLUTION: Performed by: NURSE PRACTITIONER

## 2022-05-09 PROCEDURE — 74177 CT ABD & PELVIS W/CONTRAST: CPT

## 2022-05-09 PROCEDURE — 80050 GENERAL HEALTH PANEL: CPT

## 2022-05-09 RX ADMIN — IOPAMIDOL 100 ML: 612 INJECTION, SOLUTION INTRAVENOUS at 16:57

## 2022-05-13 NOTE — PRE-PROCEDURE INSTRUCTIONS
PAT phone history completed with pt for upcoming procedure on 5/14/22 with Dr. Cade.      PAT PASS GIVEN/REVIEWED WITH PT.  VERBALIZED UNDERSTANDING OF THE FOLLOWING:  DO NOT EAT, DRINK, SMOKE, USE SMOKELESS TOBACCO OR CHEW GUM AFTER MIDNIGHT THE NIGHT BEFORE SURGERY.  THIS ALSO INCLUDES HARD CANDIES AND MINTS.    DO NOT SHAVE THE AREA TO BE OPERATED ON AT LEAST 48 HOURS PRIOR TO THE PROCEDURE.  DO NOT WEAR MAKE UP OR NAIL POLISH.  DO NOT LEAVE IN ANY PIERCING OR WEAR JEWELRY THE DAY OF SURGERY.      DO NOT USE ADHESIVES IF YOU WEAR DENTURES.    DO NOT WEAR EYE CONTACTS; BRING IN YOUR GLASSES.    ONLY TAKE MEDICATION THE MORNING OF YOUR PROCEDURE IF INSTRUCTED BY YOUR SURGEON WITH ENOUGH WATER TO SWALLOW THE MEDICATION.  IF YOUR SURGEON DID NOT SPECIFY WHICH MEDICATIONS TO TAKE, YOU WILL NEED TO CALL THEIR OFFICE FOR FURTHER INSTRUCTIONS AND DO AS THEY INSTRUCT.    LEAVE ANYTHING YOU CONSIDER VALUABLE AT HOME.    YOU WILL NEED TO ARRANGE FOR SOMEONE TO DRIVE YOU HOME AFTER SURGERY.  IT IS RECOMMENDED THAT YOU DO NOT DRIVE, WORK, DRINK ALCOHOL OR MAKE MAJOR DECISIONS FOR AT LEAST 24 HOURS AFTER YOUR PROCEDURE IS COMPLETE.      THE DAY OF YOUR PROCEDURE, BRING IN THE FOLLOWING IF APPLICABLE:   PICTURE ID AND INSURANCE/MEDICARE OR MEDICAID CARDS/ANY CO-PAY THAT MAY BE DUE   COPY OF ADVANCED DIRECTIVE/LIVING WILL/POWER OR    CPAP/BIPAP/INHALERS   SKIN PREP SHEET   YOUR PREADMISSION TESTING PASS (IF NOT A PHONE HISTORY)

## 2022-05-14 ENCOUNTER — ANESTHESIA (OUTPATIENT)
Dept: GASTROENTEROLOGY | Facility: HOSPITAL | Age: 42
End: 2022-05-14

## 2022-05-14 ENCOUNTER — ANESTHESIA EVENT (OUTPATIENT)
Dept: GASTROENTEROLOGY | Facility: HOSPITAL | Age: 42
End: 2022-05-14

## 2022-05-14 ENCOUNTER — HOSPITAL ENCOUNTER (OUTPATIENT)
Facility: HOSPITAL | Age: 42
Setting detail: HOSPITAL OUTPATIENT SURGERY
Discharge: HOME | End: 2022-05-14
Attending: INTERNAL MEDICINE | Admitting: INTERNAL MEDICINE

## 2022-05-14 VITALS
RESPIRATION RATE: 20 BRPM | DIASTOLIC BLOOD PRESSURE: 59 MMHG | TEMPERATURE: 97 F | BODY MASS INDEX: 21.83 KG/M2 | HEIGHT: 65 IN | OXYGEN SATURATION: 99 % | HEART RATE: 56 BPM | WEIGHT: 131 LBS | SYSTOLIC BLOOD PRESSURE: 95 MMHG

## 2022-05-14 DIAGNOSIS — R19.7 DIARRHEA, UNSPECIFIED TYPE: ICD-10-CM

## 2022-05-14 DIAGNOSIS — R63.4 WEIGHT LOSS: ICD-10-CM

## 2022-05-14 PROCEDURE — 45380 COLONOSCOPY AND BIOPSY: CPT | Performed by: INTERNAL MEDICINE

## 2022-05-14 PROCEDURE — 25010000002 PROPOFOL 200 MG/20ML EMULSION: Performed by: NURSE ANESTHETIST, CERTIFIED REGISTERED

## 2022-05-14 PROCEDURE — 45385 COLONOSCOPY W/LESION REMOVAL: CPT | Performed by: INTERNAL MEDICINE

## 2022-05-14 PROCEDURE — 25010000002 MIDAZOLAM PER 1MG: Performed by: NURSE ANESTHETIST, CERTIFIED REGISTERED

## 2022-05-14 PROCEDURE — 81025 URINE PREGNANCY TEST: CPT | Performed by: INTERNAL MEDICINE

## 2022-05-14 RX ORDER — LIDOCAINE HYDROCHLORIDE 20 MG/ML
INJECTION, SOLUTION INTRAVENOUS AS NEEDED
Status: DISCONTINUED | OUTPATIENT
Start: 2022-05-14 | End: 2022-05-14 | Stop reason: SURG

## 2022-05-14 RX ORDER — PROPOFOL 10 MG/ML
INJECTION, EMULSION INTRAVENOUS AS NEEDED
Status: DISCONTINUED | OUTPATIENT
Start: 2022-05-14 | End: 2022-05-14 | Stop reason: SURG

## 2022-05-14 RX ORDER — SODIUM CHLORIDE 9 MG/ML
70 INJECTION, SOLUTION INTRAVENOUS CONTINUOUS PRN
Status: DISCONTINUED | OUTPATIENT
Start: 2022-05-14 | End: 2022-05-14 | Stop reason: HOSPADM

## 2022-05-14 RX ORDER — SIMETHICONE 20 MG/.3ML
EMULSION ORAL AS NEEDED
Status: DISCONTINUED | OUTPATIENT
Start: 2022-05-14 | End: 2022-05-14 | Stop reason: HOSPADM

## 2022-05-14 RX ORDER — MIDAZOLAM HYDROCHLORIDE 2 MG/2ML
INJECTION, SOLUTION INTRAMUSCULAR; INTRAVENOUS AS NEEDED
Status: DISCONTINUED | OUTPATIENT
Start: 2022-05-14 | End: 2022-05-14 | Stop reason: SURG

## 2022-05-14 RX ADMIN — MIDAZOLAM HYDROCHLORIDE 2 MG: 1 INJECTION, SOLUTION INTRAMUSCULAR; INTRAVENOUS at 11:14

## 2022-05-14 RX ADMIN — PROPOFOL 250 MG: 10 INJECTION, EMULSION INTRAVENOUS at 11:11

## 2022-05-14 RX ADMIN — LIDOCAINE HYDROCHLORIDE 40 MG: 20 INJECTION, SOLUTION INTRAVENOUS at 11:11

## 2022-05-14 RX ADMIN — SODIUM CHLORIDE 70 ML/HR: 9 INJECTION, SOLUTION INTRAVENOUS at 09:32

## 2022-05-14 NOTE — ANESTHESIA PREPROCEDURE EVALUATION
Anesthesia Evaluation     Patient summary reviewed and Nursing notes reviewed   NPO Solid Status: > 8 hours  NPO Liquid Status: > 8 hours           Airway   Mallampati: II  TM distance: >3 FB  Neck ROM: full  No difficulty expected  Dental - normal exam     Pulmonary     breath sounds clear to auscultation  (+) a smoker Current Abstained day of surgery,   Cardiovascular - negative cardio ROS    Rhythm: regular  Rate: normal        Neuro/Psych- negative ROS  GI/Hepatic/Renal/Endo    (+)  GERD,      Musculoskeletal     (+) back pain,   Abdominal    Substance History      OB/GYN negative ob/gyn ROS         Other   arthritis,                      Anesthesia Plan    ASA 2     MAC     intravenous induction     Anesthetic plan, all risks, benefits, and alternatives have been provided, discussed and informed consent has been obtained with: patient.        CODE STATUS:

## 2022-05-14 NOTE — DISCHARGE INSTRUCTIONS
- Discharge patient to home (ambulatory).   - Resume previous diet.   - Dieatry and lifestyle changes  - Compltete abstinence from smoking  - Continue present medications.   - Await pathology results.   - Return to my office in 8 weeks.   To assist you in voiding:  Drink plenty of fluids  Listen to running water while attempting to void.    If you are unable to urinate and you have an uncomfortable urge to void or it has been   6 hours since you were discharged, return to the Emergency Room Please follow all post op instructions and follow up appointment time from your physician's office included in your discharge packet.    Rest today  No pushing,pulling,tugging,heavy lifting, or strenuous activity   No major decision making,driving,or drinking alcoholic beverages for 24 hours due to the sedation you received  Always use good hand hygiene/washing technique  No driving on pain medication.

## 2022-05-14 NOTE — ANESTHESIA POSTPROCEDURE EVALUATION
Patient: Lina Callahan    Procedure Summary     Date: 05/14/22 Room / Location: [facility] ENDOSCOPY 2 / [facility] ENDOSCOPY    Anesthesia Start: 1105 Anesthesia Stop: 1139    Procedure: COLONOSCOPY WITH COLD SNARE POLYPECTOMY AND BIOPSY (N/A Anus) Diagnosis:       Weight loss      Diarrhea, unspecified type      (Weight loss [R63.4])      (Diarrhea, unspecified type [R19.7])    Surgeons: Josette Cade MD Provider: Mariusz Min CRNA    Anesthesia Type: MAC ASA Status: 2          Anesthesia Type: MAC    Vitals  Vitals Value Taken Time   BP 95/59 05/14/22 1201   Temp 97 °F (36.1 °C) 05/14/22 1142   Pulse 56 05/14/22 1201   Resp 20 05/14/22 1201   SpO2 99 % 05/14/22 1201           Post Anesthesia Care and Evaluation    Patient location during evaluation: PHASE II  Patient participation: complete - patient participated  Level of consciousness: awake  Pain score: 0  Pain management: adequate  Airway patency: patent  Anesthetic complications: No anesthetic complications  PONV Status: none  Cardiovascular status: acceptable  Respiratory status: acceptable  Hydration status: acceptable  No anesthesia care post op

## 2022-05-14 NOTE — H&P
[facility]  HISTORY AND PHYSICAL    Patient Name: Lina Callahan  : 1980  MRN: [id number]    Chief Complaint:   For colonoscopy    History Of Presenting Illness:      Chronic diarrhea  Lower abdominal pain      Past Medical History:   Diagnosis Date   • Arthritis    • Back pain    • COVID-19 vaccine series completed     Moderna x2   • Ear piercing    • Epigastric pain    • Scoliosis    • Seasonal allergies    • Tattoos        Past Surgical History:   Procedure Laterality Date   • DILATATION AND CURETTAGE     • ENDOSCOPY N/A 2022    Procedure: ESOPHAGOGASTRODUODENOSCOPY WITH BIOPSY;  Surgeon: Josette Cade MD;  Location: [facility] ENDOSCOPY;  Service: Gastroenterology;  Laterality: N/A;   • TUBAL ABDOMINAL LIGATION     • WISDOM TOOTH EXTRACTION      x2       Social History     Socioeconomic History   • Marital status:    Tobacco Use   • Smoking status: Current Every Day Smoker     Packs/day: 0.50     Years: 20.00     Pack years: 10.00   • Smokeless tobacco: Never Used   Vaping Use   • Vaping Use: Every day   • Substances: Nicotine, Flavoring   • Devices: Disposable   Substance and Sexual Activity   • Alcohol use: Never   • Drug use: Never   • Sexual activity: Defer       Family History   Problem Relation Age of Onset   • GI problems Father    • Colon cancer Neg Hx    • Liver cancer Neg Hx    • Rectal cancer Neg Hx    • Stomach cancer Neg Hx        Prior to Admission Medications:  Medications Prior to Admission   Medication Sig Dispense Refill Last Dose   • dicyclomine (BENTYL) 20 MG tablet Take 1 tablet by mouth 3 (Three) Times a Day As Needed (abdominal pain and diarrhea). 30 tablet 1 Past Week at Unknown time   • Ginger, Zingiber officinalis, (GINGER PO) Take  by mouth As Needed.   Past Week at Unknown time   • ondansetron ODT (ZOFRAN-ODT) 4 MG disintegrating tablet Place 1 tablet on the tongue Every 8 (Eight) Hours As Needed for Nausea or Vomiting. 30 tablet 1  Past Week at Unknown time   • pantoprazole (PROTONIX) 40 MG EC tablet 1 po daily in the am 30 minutes before breakfast 30 tablet 3 5/12/2022   • PEG-KCl-NaCl-NaSulf-Na Asc-C (MOVIPREP) 100 g reconstituted solution powder Take 1,000 mL by mouth Take As Directed. 1 each 0 5/14/2022 at 0500       Allergies:  No Known Allergies     Vitals: Temp:  [98.1 °F (36.7 °C)] 98.1 °F (36.7 °C)  Heart Rate:  [57] 57  Resp:  [18] 18  BP: (103)/(70) 103/70    Review Of Systems:  Constitutional:  Negative for chills, fever, and unexpected weight change.  Respiratory:  Negative for cough, chest tightness, shortness of breath, and wheezing.  Cardiovascular:  Negative for chest pain, palpitations, and leg swelling.  Gastrointestinal:  Negative for abdominal distention, abdominal pain, Nausea, vomiting.  Neurological:  Negative for Weakness, numbness, and headaches.     Physical Exam:    General Appearance:  Alert, cooperative, in no acute distress.   Lungs:   Clear to auscultation, respirations regular, even and                 unlabored.   Heart:  Regular rhythm and normal rate.   Abdomen:   Normal bowel sounds, no masses, no organomegaly. Soft, non-tender, non-distended   Neurologic: Alert and oriented x 3. Moves all four limbs equally       Plan: COLONOSCOPY (N/A)     Josette Cade MD  5/14/2022

## 2022-05-17 LAB — REF LAB TEST METHOD: NORMAL

## 2022-06-30 ENCOUNTER — OFFICE VISIT (OUTPATIENT)
Dept: OBSTETRICS AND GYNECOLOGY | Facility: CLINIC | Age: 42
End: 2022-06-30

## 2022-06-30 VITALS
WEIGHT: 124 LBS | BODY MASS INDEX: 20.66 KG/M2 | HEIGHT: 65 IN | DIASTOLIC BLOOD PRESSURE: 62 MMHG | SYSTOLIC BLOOD PRESSURE: 102 MMHG

## 2022-06-30 DIAGNOSIS — R10.31 RIGHT LOWER QUADRANT PAIN: Primary | ICD-10-CM

## 2022-06-30 DIAGNOSIS — N83.201 RIGHT OVARIAN CYST: ICD-10-CM

## 2022-06-30 PROCEDURE — 99203 OFFICE O/P NEW LOW 30 MIN: CPT | Performed by: PHYSICIAN ASSISTANT

## 2022-06-30 NOTE — PATIENT INSTRUCTIONS
Recommend the patient follow-up for transvaginal pelvic ultrasound for better evaluation of right adnexal abnormality noted on CT scan.  She would like to try and coordinate this July 11 when she has follow-up with her GI doctor.

## 2022-06-30 NOTE — PROGRESS NOTES
Subjective   Chief Complaint   Patient presents with   • Pelvic Pain     C/O pain on right side.  CT done in May that shows an ovarian cyst       Lina Callahan is a 41 y.o. year old  presenting to be seen for complaint of pelvic pain and discussion regarding an ovarian cyst that was found on CT scan done in May.  Patient had seen gastroenterology for GI symptoms of abdominal pain, weight loss, diarrhea, nausea.  CT scan was done at [facility] 2022 and noted a abnormality in the right lower quadrant/right adnexa that was felt to be representative of a possible right hydrosalpinx or a right ovarian cyst.  There is no measurement of this area given on CT report and rather nondescript findings.  Appendix was not well seen on the CT scan but no further imaging was recommended by gastroenterology.  Her LMP was 2021.  She has had a tubal ligation.  She is having regular monthly cycles with 4 to 5-day bleeds  Her last Pap smear was in  and she would like to follow-up for annual GYN exam with Pap in the near future.  She did end up having colonoscopy and has a follow-up appointment in July to discuss results.      Past Medical History:   Diagnosis Date   • Arthritis    • Back pain    • COVID-19 vaccine series completed     Moderna x2   • Ear piercing    • Epigastric pain    • Scoliosis    • Seasonal allergies    • Tattoos         Current Outpatient Medications:   •  dicyclomine (BENTYL) 20 MG tablet, Take 1 tablet by mouth 3 (Three) Times a Day As Needed (abdominal pain and diarrhea)., Disp: 30 tablet, Rfl: 1  •  Ginger, Zingiber officinalis, (GINGER PO), Take  by mouth As Needed., Disp: , Rfl:   •  ondansetron ODT (ZOFRAN-ODT) 4 MG disintegrating tablet, Place 1 tablet on the tongue Every 8 (Eight) Hours As Needed for Nausea or Vomiting., Disp: 30 tablet, Rfl: 1  •  pantoprazole (PROTONIX) 40 MG EC tablet, 1 po daily in the am 30 minutes before breakfast, Disp: 30 tablet, Rfl: 3   No  "Known Allergies   Past Surgical History:   Procedure Laterality Date   • COLONOSCOPY N/A 5/14/2022    Procedure: COLONOSCOPY WITH COLD SNARE POLYPECTOMY AND BIOPSY;  Surgeon: Josette Cade MD;  Location: [facility] ENDOSCOPY;  Service: Gastroenterology;  Laterality: N/A;   • DILATATION AND CURETTAGE     • ENDOSCOPY N/A 02/28/2022    Procedure: ESOPHAGOGASTRODUODENOSCOPY WITH BIOPSY;  Surgeon: Josette Cade MD;  Location: [facility] ENDOSCOPY;  Service: Gastroenterology;  Laterality: N/A;   • TUBAL ABDOMINAL LIGATION     • WISDOM TOOTH EXTRACTION      x2      Social History     Socioeconomic History   • Marital status:    Tobacco Use   • Smoking status: Current Every Day Smoker     Packs/day: 0.50     Years: 20.00     Pack years: 10.00   • Smokeless tobacco: Never Used   Vaping Use   • Vaping Use: Every day   • Substances: Nicotine, Flavoring   • Devices: Disposable   Substance and Sexual Activity   • Alcohol use: Never   • Drug use: Never   • Sexual activity: Yes     Partners: Male     Birth control/protection: Surgical      Family History   Problem Relation Age of Onset   • GI problems Father    • Colon cancer Neg Hx    • Liver cancer Neg Hx    • Rectal cancer Neg Hx    • Stomach cancer Neg Hx        Review of Systems   Constitutional: Negative for chills, diaphoresis and fever.   Gastrointestinal: Positive for abdominal pain, diarrhea and nausea.   Genitourinary: Positive for pelvic pain. Negative for difficulty urinating, dysuria, menstrual problem and vaginal discharge.           Objective   /62   Ht 165.1 cm (65\")   Wt 56.2 kg (124 lb)   LMP 06/25/2022 (Exact Date)   Breastfeeding No   BMI 20.63 kg/m²     Physical Exam  Constitutional:       Appearance: Normal appearance. She is well-developed and well-groomed.   Eyes:      General: Lids are normal.      Extraocular Movements: Extraocular movements intact.      Conjunctiva/sclera: Conjunctivae normal.   Abdominal:      Palpations: " Abdomen is soft.      Tenderness: There is no abdominal tenderness.   Genitourinary:     Labia:         Right: No rash, tenderness or lesion.         Left: No rash, tenderness or lesion.       Urethra: No prolapse, urethral pain, urethral swelling or urethral lesion.      Vagina: No signs of injury. No tenderness or lesions.      Cervix: No cervical motion tenderness, discharge, friability or lesion.      Uterus: Not enlarged and not tender.       Adnexa:         Right: No mass or tenderness.          Left: No mass or tenderness.     Skin:     General: Skin is warm and dry.      Findings: No bruising or lesion.   Neurological:      Mental Status: She is alert.   Psychiatric:         Attention and Perception: Attention normal.         Mood and Affect: Mood normal.         Speech: Speech normal.         Behavior: Behavior is cooperative.            Result Review :   The following data was reviewed by: Sejal Eduardo PA-C on 06/30/2022:    Data reviewed: Radiologic studies Ct scan report            Assessment and Plan  Diagnoses and all orders for this visit:    1. Right lower quadrant pain (Primary)    2. Right ovarian cyst      Patient Instructions   Recommend the patient follow-up for transvaginal pelvic ultrasound for better evaluation of right adnexal abnormality noted on CT scan.  She would like to try and coordinate this July 11 when she has follow-up with her GI doctor.             This note was electronically signed.    Sejal Eduardo PA-C   June 30, 2022

## 2022-07-11 ENCOUNTER — OFFICE VISIT (OUTPATIENT)
Dept: GASTROENTEROLOGY | Facility: CLINIC | Age: 42
End: 2022-07-11

## 2022-07-11 VITALS
HEART RATE: 64 BPM | SYSTOLIC BLOOD PRESSURE: 107 MMHG | WEIGHT: 124 LBS | DIASTOLIC BLOOD PRESSURE: 70 MMHG | HEIGHT: 65 IN | BODY MASS INDEX: 20.66 KG/M2 | TEMPERATURE: 98.7 F

## 2022-07-11 DIAGNOSIS — K92.9 FUNCTIONAL GASTROINTESTINAL DISORDER: Chronic | ICD-10-CM

## 2022-07-11 DIAGNOSIS — Z86.010 PERSONAL HISTORY OF COLONIC POLYPS: Chronic | ICD-10-CM

## 2022-07-11 DIAGNOSIS — K31.84 GASTROPARESIS: Chronic | ICD-10-CM

## 2022-07-11 DIAGNOSIS — Z87.891 FORMER SMOKER: Chronic | ICD-10-CM

## 2022-07-11 DIAGNOSIS — R10.13 EPIGASTRIC PAIN: ICD-10-CM

## 2022-07-11 DIAGNOSIS — K21.9 GASTROESOPHAGEAL REFLUX DISEASE WITHOUT ESOPHAGITIS: Chronic | ICD-10-CM

## 2022-07-11 DIAGNOSIS — R11.0 NAUSEA: ICD-10-CM

## 2022-07-11 DIAGNOSIS — R63.4 WEIGHT LOSS: Primary | Chronic | ICD-10-CM

## 2022-07-11 DIAGNOSIS — R10.30 LOWER ABDOMINAL PAIN: ICD-10-CM

## 2022-07-11 DIAGNOSIS — R19.7 DIARRHEA, UNSPECIFIED TYPE: ICD-10-CM

## 2022-07-11 DIAGNOSIS — R63.0 LOSS OF APPETITE: ICD-10-CM

## 2022-07-11 PROCEDURE — 99214 OFFICE O/P EST MOD 30 MIN: CPT | Performed by: NURSE PRACTITIONER

## 2022-07-11 RX ORDER — PANTOPRAZOLE SODIUM 20 MG/1
TABLET, DELAYED RELEASE ORAL
Qty: 30 TABLET | Refills: 5 | Status: SHIPPED | OUTPATIENT
Start: 2022-07-11

## 2022-07-11 NOTE — PATIENT INSTRUCTIONS
Antireflux measures: Avoid fried, fatty foods, alcohol, chocolate, coffee, tea,  soft drinks, peppermint and spearmint, spicy foods, tomatoes and tomato based foods, onions, peppers, and smoking.   Other antireflux measures include weight reduction if overweight, avoiding tight clothing around the abdomen, elevating the head of the bed 6 inches with blocks under the head board, and don't drink or eat before going to bed and avoid lying down immediately after meals.  Avoid vaping/smoking.  Pantoprazole 20 mg 1 by mouth in the am 30 minutes before breakfast. Try to decrease to use as needed in the future.   Zofran 4 mg 1 po every 8 hours as needed for nausea.   Low FODMAP diet - avoid all dairy.   Bentyl 20 mg 1 po 3 times per day as needed for lower abdominal pain and diarrhea.  The patient should eat 4-5 very small meals throughout the day. Avoid large meals.  It is recommended to eat a softer diet. Meats are best consumed ground. Fruits and vegetables are best consumed cooked or steamed and then mashed.  Avoid dairy as this can worsen diarrhea and abdominal pain.   Labs  CT Chest  Continue follow up with gynecology.  Colonoscopy for surveillance in 7 years, 2029.   Follow up: 6 months or sooner if needed

## 2022-07-11 NOTE — PROGRESS NOTES
Follow Up Note     Date: 2022   Patient Name: Lina Callahan  MRN: [id number]  : 1980     Primary Care Provider: Sarah Souza DO     Chief Complaint   Patient presents with   • Follow-up   • Weight Loss     History of present illness:   2022  Lina Callahan is a 41 y.o. female who is here today for follow up for weight loss.     She has lost an additional 7 pounds since her last visit that is unintentional. She has been eating smaller meals throughout the day, but is not trying to lose weight. Abdominal pain, bowel habits and nausea have been better. Denies GI bleeding.     Interval History:  2021  She has a history of epigastric pain for the past year that has gradually worsened. Eating makes the pain worse. She has the pain 4-5 days per week. She has associated nausea that is gradually worsening as well. Eating makes nausea worse. She has vomiting once a month on average. No history of hematemesis. She had melena several months ago, but none since. She takes Ibuprofen at least once a month for menstrual pain and occasional headaches. She has been having reflux for the past few weeks that is severe. She has frequent burping. She has not had reflux in the past and is not taking anything for reflux.      She has diarrhea 3-4 times per month that can last all day and is associated with the pain and nausea. She has occasional lower abdominal cramping associated with the diarrhea. No history of rectal bleeding.      She has not had colonoscopy or EGD in the past. There is no family history of GI malignancy or IBD.    Subjective      Past Medical History:   Diagnosis Date   • Arthritis    • Back pain    • COVID-19 vaccine series completed     Moderna x2   • Ear piercing    • Epigastric pain    • Scoliosis    • Seasonal allergies    • Tattoos      Past Surgical History:   Procedure Laterality Date   • COLONOSCOPY N/A 2022    Procedure: COLONOSCOPY WITH COLD SNARE POLYPECTOMY  AND BIOPSY;  Surgeon: Josette Cade MD;  Location: [facility] ENDOSCOPY;  Service: Gastroenterology;  Laterality: N/A;   • DILATATION AND CURETTAGE     • ENDOSCOPY N/A 2022    Procedure: ESOPHAGOGASTRODUODENOSCOPY WITH BIOPSY;  Surgeon: Josette Cade MD;  Location: [facility] ENDOSCOPY;  Service: Gastroenterology;  Laterality: N/A;   • TUBAL ABDOMINAL LIGATION     • WISDOM TOOTH EXTRACTION      x2     Family History   Problem Relation Age of Onset   • GI problems Father    • Colon cancer Neg Hx    • Liver cancer Neg Hx    • Rectal cancer Neg Hx    • Stomach cancer Neg Hx      Social History     Socioeconomic History   • Marital status:    Tobacco Use   • Smoking status: Former Smoker     Packs/day: 0.50     Years: 20.00     Pack years: 10.00     Quit date: 2022     Years since quittin.1   • Smokeless tobacco: Never Used   Vaping Use   • Vaping Use: Every day   • Substances: Nicotine, Flavoring   • Devices: Disposable   Substance and Sexual Activity   • Alcohol use: Never   • Drug use: Never   • Sexual activity: Yes     Partners: Male     Birth control/protection: Surgical       Current Outpatient Medications:   •  dicyclomine (BENTYL) 20 MG tablet, Take 1 tablet by mouth 3 (Three) Times a Day As Needed (abdominal pain and diarrhea)., Disp: 30 tablet, Rfl: 1  •  Ginger, Zingiber officinalis, (GINGER PO), Take  by mouth As Needed., Disp: , Rfl:   •  ondansetron ODT (ZOFRAN-ODT) 4 MG disintegrating tablet, Place 1 tablet on the tongue Every 8 (Eight) Hours As Needed for Nausea or Vomiting., Disp: 30 tablet, Rfl: 1  •  pantoprazole (PROTONIX) 20 MG EC tablet, 1 po in the am 30 minutes before breakfast., Disp: 30 tablet, Rfl: 5     No Known Allergies     The following portions of the patient's history were reviewed and updated as appropriate: allergies, current medications, past family history, past medical history, past social history, past surgical history and problem list.  Objective  "    Physical Exam  Vitals and nursing note reviewed.   Constitutional:       General: She is not in acute distress.     Appearance: Normal appearance. She is well-developed.   HENT:      Head: Normocephalic and atraumatic.      Mouth/Throat:      Mouth: Mucous membranes are not pale, not dry and not cyanotic.   Eyes:      General: Lids are normal.   Neck:      Trachea: Trachea normal.   Cardiovascular:      Rate and Rhythm: Normal rate.   Pulmonary:      Effort: Pulmonary effort is normal. No respiratory distress.      Breath sounds: Normal breath sounds.   Abdominal:      Palpations: There is no mass.      Tenderness: There is no abdominal tenderness.   Skin:     General: Skin is warm and dry.   Neurological:      Mental Status: She is alert and oriented to person, place, and time.   Psychiatric:         Mood and Affect: Mood normal.         Speech: Speech normal.         Behavior: Behavior normal. Behavior is cooperative.       Vitals:    07/11/22 1602   BP: 107/70   Pulse: 64   Temp: 98.7 °F (37.1 °C)   TempSrc: Infrared   Weight: 56.2 kg (124 lb)   Height: 165.1 cm (65\")     Body mass index is 20.63 kg/m².     Results Review:   I reviewed the patient's new clinical results.    Admission on 05/14/2022, Discharged on 05/14/2022   Component Date Value Ref Range Status   • HCG, Urine, QL 05/14/2022 Negative  Negative Final   • Lot Number 05/14/2022 [id number]   Final   • Internal Positive Control 05/14/2022 Passed  Positive, Passed Final   • Internal Negative Control 05/14/2022 Passed  Negative, Passed Final   • Expiration Date 05/14/2022 07/31/2023   Final   • Reference Lab Report 05/14/2022    Final                    Value:[facility]  [address] [place] [address]  Phone: [phone] or [phone]  Fax: [phone]  Daniel Montano M.D., Medical Director    PATIENT NAME                           LABORATORY NO.  [id number]                  " [id number]  [id number]                         AGE              SEX  SSN           CLIENT REF #  [facility]                  1980      [id number]   [id number]    [address] [place] [address] BY-PASS                REQUESTING M.D.     ATTENDING M.D.     [place] [address]                 JAGANNATH  DATE COLLECTED      DATE RECEIVED      DATE REPORTED  2022    DIAGNOSIS:  A.   SIGMOID COLON POLYPS:  Hyperplastic polyps  B.   TERMINAL ILEUM BIOPSY:  Small intestinal mucosa with no significant histopathologic abnormalities  C.   CECUM POLYP:  Sessile serrated adenoma  Negative for                           cytologic dysplasia  D.   COLON, BIOPSIES:  Colonic type mucosa with no significant histopathologic abnormalities    MARTHA    CLINICAL HISTORY:  Weight loss, diarrhea    SPECIMENS RECEIVED:  A.  SIGMOID COLON POLYPS  B.  TERMINAL ILEUM BIOPSY  C.  CECUM POLYP  D.  COLON, BIOPSIES                     REVIEWED, DIAGNOSED AND ELECTRONICALLY  SIGNED BY:    Cong Montes M.D., F.C.A.P.  CPT CODES:  88305x4     Lab on 2022   Component Date Value Ref Range Status   • TSH 2022 1.470  0.270 - 4.200 uIU/mL Final   • WBC 2022 6.34  3.40 - 10.80 10*3/mm3 Final   • RBC 2022 4.36  3.77 - 5.28 10*6/mm3 Final   • Hemoglobin 2022 13.4  12.0 - 15.9 g/dL Final   • Hematocrit 2022 39.9  34.0 - 46.6 % Final   • MCV 2022 91.5  79.0 - 97.0 fL Final   • MCH 2022 30.7  26.6 - 33.0 pg Final   • MCHC 2022 33.6  31.5 - 35.7 g/dL Final   • RDW 2022 12.1 (A) 12.3 - 15.4 % Final   • RDW-SD 2022 40.6  37.0 - 54.0 fl Final   • MPV 2022 12.5 (A) 6.0 - 12.0 fL Final   • Platelets 2022 199  140 - 450 10*3/mm3 Final   • Glucose 2022 95  65 - 99 mg/dL Final   • BUN 2022 8  6 - 20 mg/dL Final   • Creatinine 2022 0.88  0.57 - 1.00  mg/dL Final   • Sodium 05/09/2022 139  136 - 145 mmol/L Final   • Potassium 05/09/2022 3.9  3.5 - 5.2 mmol/L Final    Slight hemolysis detected by analyzer. Results may be affected.   • Chloride 05/09/2022 105  98 - 107 mmol/L Final   • CO2 05/09/2022 25.3  22.0 - 29.0 mmol/L Final   • Calcium 05/09/2022 9.3  8.6 - 10.5 mg/dL Final   • Total Protein 05/09/2022 6.6  6.0 - 8.5 g/dL Final   • Albumin 05/09/2022 3.90  3.50 - 5.20 g/dL Final   • ALT (SGPT) 05/09/2022 8  1 - 33 U/L Final   • AST (SGOT) 05/09/2022 16  1 - 32 U/L Final    Slight hemolysis detected by analyzer. Results may be affected.   • Alkaline Phosphatase 05/09/2022 56  39 - 117 U/L Final   • Total Bilirubin 05/09/2022 0.4  0.0 - 1.2 mg/dL Final   • Globulin 05/09/2022 2.7  gm/dL Final   • A/G Ratio 05/09/2022 1.4  g/dL Final   • BUN/Creatinine Ratio 05/09/2022 9.1  7.0 - 25.0 Final   • Anion Gap 05/09/2022 8.7  5.0 - 15.0 mmol/L Final   • eGFR 05/09/2022 84.8  >60.0 mL/min/1.73 Final    National Kidney Foundation and American Society of Nephrology (ASN) Task Force recommended calculation based on the Chronic Kidney Disease Epidemiology Collaboration (CKD-EPI) equation refit without adjustment for race.      Abdominal ultrasound dated 6/23/2021   Unremarkable right upper quadrant ultrasound.     Nuclear medicine hepatobiliary scan dated 6/30/2021  Normal ejection fraction of 84%    CT Abdomen Pelvis With Contrast     Result Date: 5/10/2022  1. Possible hydrosalpinx or ovoid ovarian cyst in the right lower quadrant. Endovaginal ultrasound could better evaluate. 2. Appendix not well seen.  This report was signed and finalized on 5/10/2022 10:52 AM by Geraldo Chung MD.     EGD dated 2/28/2022 per Dr. Cade  - Normal oropharynx.  - Z-line regular, 36 cm from the incisors.  - 1 cm hiatal hernia.  - No gross lesions in esophagus.  - Erythematous mucosa in the posterior wall of the stomach, antrum and prepyloric region of the stomach. Biopsied.  - A medium  amount of food (residue) in the stomach.  - Normal duodenal bulb, first portion of the duodenum, second portion of the duodenum and third portion of the duodenum. Biopsied.  - Gastric views were inadequate due to food debris  - Findings suggestive of gastroparesis  -Small bowel biopsy unremarkable.  Gastric antrum biopsy with mild reactive gastropathy, no H. pylori.    Colonoscopy dated 5/14/2022 per Dr. Cade  - One 8 mm polyp in the cecum, removed with a cold snare. Resected and retrieved.  - One 4 to 5 mm polyp in the mid sigmoid colon, removed with a cold snare. Resected and retrieved.  - A few diminutive polyps at the recto-sigmoid colon and in the distal sigmoid colon, three of these removed with a cold snare. Resected and retrieved.  - Non-bleeding external and internal hemorrhoids.  - The examined portion of the ileum was normal. Biopsied.  - Biopsies were taken with a cold forceps from the right colon, left colon and transverse colon for evaluation of microscopic colitis.  - No endoscopic signs of colitis or ileitis  - Patient likely has functional GI disorder  A.   SIGMOID COLON POLYPS:   Hyperplastic polyps   B.   TERMINAL ILEUM BIOPSY:   Small intestinal mucosa with no significant histopathologic abnormalities   C.   CECUM POLYP:   Sessile serrated adenoma   Negative for cytologic dysplasia   D.   COLON, BIOPSIES:   Colonic type mucosa with no significant histopathologic abnormalities    Assessment / Plan      1. Weight loss  2. Former smoker  3. Lower abdominal pain  4. Diarrhea, unspecified type  5. Epigastric pain  6. Nausea  7. Loss of appetite  8. Gastroparesis  9. Functional gastrointestinal disorder  She has a longstanding history of abdominal pain nausea and diarrhea that is doing better at this time.  Denies rectal bleeding.  She has lost about 23 pounds since November 2021 that is not intentional.  She is a former smoker.  Celiac panel negative.  TSH normal.  No history of anemia.  Liver  enzymes normal.  Abdominal ultrasound unremarkable.  CTAP with possible hydrosalpinx or ovoid ovarian cyst in the right lower quadrant.  She has appointment with gynecology next week.  EGD with findings suggestive of gastroparesis, biopsies unremarkable.  Colonoscopy with polyps removed, otherwise unremarkable.  No endoscopic signs of colitis or ileitis.  Likely functional gastrointestinal disorder.  Etiology of weight loss unclear, no GI cause for weight loss.  Continue Bentyl and Zofran as needed.  Keep appointment with gynecology.  Hepatitis panel and HIV to rule out cause for weight loss.  CT of chest as she is a former smoker to rule out lung malignancy that could cause weight loss.  If labs and CT unremarkable, needs further evaluation of weight loss.    - Comprehensive Metabolic Panel; Future  - Hepatitis A Antibody, Total; Future  - Hepatitis B Surf Antibody Quant; Future  - Hepatitis B Surface Antigen; Future  - Hepatitis B Core Antibody, Total; Future  - Hepatitis C Antibody; Future  - HIV-1 / O / 2 Ag / Antibody 4th Generation; Future  - CT Chest With Contrast Diagnostic; Future    10. Gastroesophageal reflux disease without esophagitis  She has a history of reflux for several years that is reasonably controlled with pantoprazole 40 mg daily.  No difficulty swallowing.  EGD with findings suggestive of gastroparesis, no Leon's.  Antireflux measures.  Decrease to pantoprazole 20 mg daily.  Try to decrease to use as needed in the future.    - pantoprazole (PROTONIX) 20 MG EC tablet; 1 po in the am 30 minutes before breakfast.  Dispense: 30 tablet; Refill: 5    11. Personal history of colonic polyps  Colonoscopy with polyps removed, 1 sessile serrated adenoma without dysplasia.  No family history of colon cancer.  Colonoscopy for surveillance in 7 years, 2029.    Patient Instructions   1. Antireflux measures: Avoid fried, fatty foods, alcohol, chocolate, coffee, tea,  soft drinks, peppermint and spearmint,  spicy foods, tomatoes and tomato based foods, onions, peppers, and smoking.   2. Other antireflux measures include weight reduction if overweight, avoiding tight clothing around the abdomen, elevating the head of the bed 6 inches with blocks under the head board, and don't drink or eat before going to bed and avoid lying down immediately after meals.  3. Avoid vaping/smoking.  4. Pantoprazole 20 mg 1 by mouth in the am 30 minutes before breakfast. Try to decrease to use as needed in the future.   5. Zofran 4 mg 1 po every 8 hours as needed for nausea.   6. Low FODMAP diet - avoid all dairy.   7. Bentyl 20 mg 1 po 3 times per day as needed for lower abdominal pain and diarrhea.  8. The patient should eat 4-5 very small meals throughout the day. Avoid large meals.  9. It is recommended to eat a softer diet. Meats are best consumed ground. Fruits and vegetables are best consumed cooked or steamed and then mashed.  10. Avoid dairy as this can worsen diarrhea and abdominal pain.   11. Labs  12. CT Chest  13. Continue follow up with gynecology.  14. Colonoscopy for surveillance in 7 years, 2029.  15.  Follow up: 6 months or sooner if needed    Joseph Saucedo, APRN  7/11/2022    Please note that portions of this note may have been completed with a voice recognition program. Efforts were made to edit the dictations, but occasionally words are mistranscribed.

## 2022-07-19 ENCOUNTER — DOCUMENTATION (OUTPATIENT)
Dept: OBSTETRICS AND GYNECOLOGY | Facility: CLINIC | Age: 42
End: 2022-07-19

## 2022-07-19 NOTE — PROGRESS NOTES
Patient is informed of results of pelvic ultrasound done yesterday.  Uterus is normal.  Her endometrium is 13 mm.  Left ovary has small follicles.  The right ovary has a 1.9 cm complex area.  Given the small complex cyst I do feel that this will resolve however I would recommend a follow-up ultrasound in 6 to 8 weeks.  Recommend the patient return for the ultrasound immediately after menses so endometrium can be remeasured as well.  Patient will go through this upcoming.  That is due within the next week and then she is advised to call the first day of the second menses to schedule ultrasound at the end of menses.

## 2022-08-08 ENCOUNTER — APPOINTMENT (OUTPATIENT)
Dept: CT IMAGING | Facility: HOSPITAL | Age: 42
End: 2022-08-08

## 2022-09-01 ENCOUNTER — LAB (OUTPATIENT)
Dept: LAB | Facility: HOSPITAL | Age: 42
End: 2022-09-01

## 2022-09-01 ENCOUNTER — DOCUMENTATION (OUTPATIENT)
Dept: OBSTETRICS AND GYNECOLOGY | Facility: CLINIC | Age: 42
End: 2022-09-01

## 2022-09-01 DIAGNOSIS — N83.291 COMPLEX CYST OF RIGHT OVARY: Primary | ICD-10-CM

## 2022-09-01 DIAGNOSIS — R63.4 WEIGHT LOSS: Chronic | ICD-10-CM

## 2022-09-01 LAB
HIV1 P24 AG SER QL: NORMAL
HIV1+2 AB SER QL: NORMAL

## 2022-09-01 PROCEDURE — 86708 HEPATITIS A ANTIBODY: CPT

## 2022-09-01 PROCEDURE — 36415 COLL VENOUS BLD VENIPUNCTURE: CPT | Performed by: PHYSICIAN ASSISTANT

## 2022-09-01 PROCEDURE — 80053 COMPREHEN METABOLIC PANEL: CPT

## 2022-09-01 PROCEDURE — 86304 IMMUNOASSAY TUMOR CA 125: CPT | Performed by: PHYSICIAN ASSISTANT

## 2022-09-01 PROCEDURE — 86317 IMMUNOASSAY INFECTIOUS AGENT: CPT

## 2022-09-01 PROCEDURE — G0475 HIV COMBINATION ASSAY: HCPCS

## 2022-09-01 PROCEDURE — 86704 HEP B CORE ANTIBODY TOTAL: CPT

## 2022-09-01 PROCEDURE — 87340 HEPATITIS B SURFACE AG IA: CPT

## 2022-09-01 PROCEDURE — 86803 HEPATITIS C AB TEST: CPT

## 2022-09-01 NOTE — PROGRESS NOTES
Patient is informed the results of pelvic ultrasound done yesterday.  She has a persistent complex cyst on the right ovary that is 2 cm and appears very similar to previous scan.  Endometrium is still somewhat thickened at 13.7 mm.  Recommend patient have a  for persistent complex ovarian cyst.  An order is placed for the outpatient lab and patient will go in for that within the next few days.  Pending those results we also discussed endometrial sampling and will most likely have her return for endometrial biopsy for further evaluation.

## 2022-09-02 LAB
ALBUMIN SERPL-MCNC: 3.9 G/DL (ref 3.5–5.2)
ALBUMIN/GLOB SERPL: 1.6 G/DL
ALP SERPL-CCNC: 48 U/L (ref 39–117)
ALT SERPL W P-5'-P-CCNC: 8 U/L (ref 1–33)
ANION GAP SERPL CALCULATED.3IONS-SCNC: 11.4 MMOL/L (ref 5–15)
AST SERPL-CCNC: 16 U/L (ref 1–32)
BILIRUB SERPL-MCNC: 0.4 MG/DL (ref 0–1.2)
BUN SERPL-MCNC: 14 MG/DL (ref 6–20)
BUN/CREAT SERPL: 15.9 (ref 7–25)
CALCIUM SPEC-SCNC: 9 MG/DL (ref 8.6–10.5)
CANCER AG125 SERPL QL: 15.5 U/ML (ref 0–38.1)
CHLORIDE SERPL-SCNC: 104 MMOL/L (ref 98–107)
CO2 SERPL-SCNC: 21.6 MMOL/L (ref 22–29)
CREAT SERPL-MCNC: 0.88 MG/DL (ref 0.57–1)
EGFRCR SERPLBLD CKD-EPI 2021: 84.8 ML/MIN/1.73
GLOBULIN UR ELPH-MCNC: 2.5 GM/DL
GLUCOSE SERPL-MCNC: 104 MG/DL (ref 65–99)
HBV SURFACE AG SERPL QL IA: NORMAL
HCV AB SER DONR QL: NORMAL
POTASSIUM SERPL-SCNC: 3.5 MMOL/L (ref 3.5–5.2)
PROT SERPL-MCNC: 6.4 G/DL (ref 6–8.5)
SODIUM SERPL-SCNC: 137 MMOL/L (ref 136–145)

## 2022-09-02 NOTE — PROGRESS NOTES
Please inform patient that her  has returned normal.  We discussed endometrial biopsy with her thickened endometrium and I would like her to return for endometrial biopsy.  However I think she is also past due a Pap smear and would recommend she have her annual/Pap and can attempt endometrial biopsy same day.

## 2022-09-03 LAB
HAV AB SER QL IA: NEGATIVE
HBV CORE AB SERPL QL IA: NEGATIVE
HBV SURFACE AB SER-ACNC: <3.1 MIU/ML

## 2022-10-06 ENCOUNTER — OFFICE VISIT (OUTPATIENT)
Dept: OBSTETRICS AND GYNECOLOGY | Facility: CLINIC | Age: 42
End: 2022-10-06

## 2022-10-06 VITALS
SYSTOLIC BLOOD PRESSURE: 102 MMHG | DIASTOLIC BLOOD PRESSURE: 60 MMHG | BODY MASS INDEX: 19.56 KG/M2 | HEIGHT: 65 IN | WEIGHT: 117.4 LBS

## 2022-10-06 DIAGNOSIS — Z12.4 SCREENING FOR CERVICAL CANCER: ICD-10-CM

## 2022-10-06 DIAGNOSIS — R93.89 THICKENED ENDOMETRIUM: ICD-10-CM

## 2022-10-06 DIAGNOSIS — N92.6 IRREGULAR MENSES: ICD-10-CM

## 2022-10-06 DIAGNOSIS — Z12.31 ENCOUNTER FOR SCREENING MAMMOGRAM FOR MALIGNANT NEOPLASM OF BREAST: ICD-10-CM

## 2022-10-06 DIAGNOSIS — Z01.419 WELL WOMAN EXAM WITH ROUTINE GYNECOLOGICAL EXAM: Primary | ICD-10-CM

## 2022-10-06 LAB
B-HCG UR QL: NEGATIVE
EXPIRATION DATE: NORMAL
INTERNAL NEGATIVE CONTROL: NEGATIVE
INTERNAL POSITIVE CONTROL: POSITIVE
Lab: NORMAL

## 2022-10-06 PROCEDURE — 81025 URINE PREGNANCY TEST: CPT | Performed by: PHYSICIAN ASSISTANT

## 2022-10-06 PROCEDURE — 58100 BIOPSY OF UTERUS LINING: CPT | Performed by: PHYSICIAN ASSISTANT

## 2022-10-06 PROCEDURE — 99396 PREV VISIT EST AGE 40-64: CPT | Performed by: PHYSICIAN ASSISTANT

## 2022-10-06 NOTE — PROGRESS NOTES
Endometrial Biopsy    Date of procedure:  10/6/2022    Indication:                                     Thickened endometrium, irregular menses    Procedure documentation:    After informed consent obtained and all questions answered, the patient was placed in lithotomy position.  The cervix was grasped anterior at the 12 o'clock position.  The cavity sounded to 7 centimeters.  An endometrial biopsy specimen was obtained with multiple passes.  The tissue was sent for permanent histopathologic evaluation.  Tenaculum was removed from the cervix with scant bleeding. Patient tolerated the procedure well. EBL minimal.    Post procedure instructions: She was instructed to call us in 1 week's time if she has not heard from us otherwise.  If there is any significant pelvic pain,  fever,  or excessive bleeding she is to call immediately.    Follow up  prn    This note was electronically signed.    Sejal Eduardo PA-C  October 6, 2022

## 2022-10-06 NOTE — PROGRESS NOTES
Subjective   Chief Complaint   Patient presents with   • Gynecologic Exam     Patient is here today for Annual exam and endometrial biopsy, Last pap done in 2016, patient has never had a mammogram, UPT-Negative       Linalauri Callahan is a 41 y.o. year old  presenting to be seen for her annual gynecological exam.   She is also undergoing endometrial biopsy for thickened endometrium on pelvic ultrasound x2.  She has had a tubal ligation  Has not had a screening mammogram before  She was seen initially in  for pelvic pain.  She had had CT scan ordered by gastroenterology which noted a right adnexal cystic area.  Patient had been seeing gastroenterology for abdominal pain, weight loss, nausea and diarrhea.  The patient returned for transvaginal pelvic ultrasound in office 2022.  She was noted to have a 1.9 cm complex area on the right ovary.  Her endometrium was slightly thickened at 13 mm.  A follow-up ultrasound and office 2022 noted a persistent 2 cm complex area in the right ovary/adnexa.  Endometrium remained thickened at 14 mm and ultrasound had been done right after menses.   obtained at 2022 returned normal at 15.5  Since her previous visit she reports her menses have become irregular occurring every 21 to 30 days.    Past Medical History:   Diagnosis Date   • Arthritis    • Back pain    • COVID-19 vaccine series completed     Moderna x2   • Ear piercing    • Epigastric pain    • Scoliosis    • Seasonal allergies    • Tattoos         Current Outpatient Medications:   •  dicyclomine (BENTYL) 20 MG tablet, Take 1 tablet by mouth 3 (Three) Times a Day As Needed (abdominal pain and diarrhea)., Disp: 30 tablet, Rfl: 1  •  Ginger, Zingiber officinalis, (GINGER PO), Take  by mouth As Needed., Disp: , Rfl:   •  ondansetron ODT (ZOFRAN-ODT) 4 MG disintegrating tablet, Place 1 tablet on the tongue Every 8 (Eight) Hours As Needed for Nausea or Vomiting., Disp: 30 tablet, Rfl: 1  •   "pantoprazole (PROTONIX) 20 MG EC tablet, 1 po in the am 30 minutes before breakfast., Disp: 30 tablet, Rfl: 5   No Known Allergies   Past Surgical History:   Procedure Laterality Date   • COLONOSCOPY N/A 2022    Procedure: COLONOSCOPY WITH COLD SNARE POLYPECTOMY AND BIOPSY;  Surgeon: Josette Cade MD;  Location: [facility] ENDOSCOPY;  Service: Gastroenterology;  Laterality: N/A;   • DILATATION AND CURETTAGE     • ENDOSCOPY N/A 2022    Procedure: ESOPHAGOGASTRODUODENOSCOPY WITH BIOPSY;  Surgeon: Josette Cade MD;  Location: [facility] ENDOSCOPY;  Service: Gastroenterology;  Laterality: N/A;   • TUBAL ABDOMINAL LIGATION     • WISDOM TOOTH EXTRACTION      x2      Social History     Socioeconomic History   • Marital status:    Tobacco Use   • Smoking status: Former Smoker     Packs/day: 0.50     Years: 20.00     Pack years: 10.00     Quit date: 2022     Years since quittin.4   • Smokeless tobacco: Never Used   Vaping Use   • Vaping Use: Every day   • Substances: Nicotine, Flavoring   • Devices: Disposable   Substance and Sexual Activity   • Alcohol use: Never   • Drug use: Never   • Sexual activity: Yes     Partners: Male     Birth control/protection: Surgical      Family History   Problem Relation Age of Onset   • GI problems Father    • Colon cancer Neg Hx    • Liver cancer Neg Hx    • Rectal cancer Neg Hx    • Stomach cancer Neg Hx        Review of Systems   Constitutional: Positive for unexpected weight change. Negative for chills, diaphoresis and fever.   Gastrointestinal: Positive for diarrhea and nausea. Negative for abdominal pain.   Genitourinary: Positive for menstrual problem. Negative for difficulty urinating and dysuria.           Objective   /60   Ht 165.1 cm (65\")   Wt 53.3 kg (117 lb 6.4 oz)   LMP 2022 (Exact Date)   Breastfeeding No   BMI 19.54 kg/m²     Physical Exam  Constitutional:       Appearance: Normal appearance. She is well-developed and " well-groomed.   Eyes:      General: Lids are normal.      Extraocular Movements: Extraocular movements intact.      Conjunctiva/sclera: Conjunctivae normal.   Neck:      Thyroid: No thyroid mass or thyromegaly.   Cardiovascular:      Rate and Rhythm: Regular rhythm.      Heart sounds: Normal heart sounds.   Pulmonary:      Effort: Pulmonary effort is normal.      Breath sounds: Normal breath sounds.   Chest:   Breasts: Breasts are symmetrical.      Right: No inverted nipple, mass, nipple discharge, skin change, tenderness or axillary adenopathy.      Left: No inverted nipple, mass, nipple discharge, skin change, tenderness or axillary adenopathy.       Abdominal:      General: There is no distension.      Palpations: Abdomen is soft. There is no hepatomegaly or splenomegaly.      Tenderness: There is no abdominal tenderness.   Genitourinary:     Exam position: Lithotomy position.      Labia:         Right: No rash, tenderness or lesion.         Left: No rash, tenderness or lesion.       Urethra: No prolapse, urethral pain, urethral swelling or urethral lesion.      Vagina: No vaginal discharge, tenderness or lesions.      Cervix: No cervical motion tenderness, discharge, friability or lesion.      Uterus: Not enlarged and not tender.       Adnexa:         Right: No mass or tenderness.          Left: No mass or tenderness.     Musculoskeletal:      Cervical back: Neck supple.   Lymphadenopathy:      Upper Body:      Right upper body: No axillary adenopathy.      Left upper body: No axillary adenopathy.   Skin:     General: Skin is warm and dry.      Findings: No lesion.   Neurological:      General: No focal deficit present.      Mental Status: She is alert and oriented to person, place, and time.   Psychiatric:         Attention and Perception: Attention normal.         Mood and Affect: Mood normal.         Speech: Speech normal.         Behavior: Behavior normal.         Thought Content: Thought content normal.             Result Review :                   Assessment and Plan  Diagnoses and all orders for this visit:    1. Well woman exam with routine gynecological exam (Primary)    2. Screening for cervical cancer  -     LIQUID-BASED PAP SMEAR, [facility] ([facility])    3. Thickened endometrium  -     POC Pregnancy, Urine  -     TISSUE EXAM, [facility] ([facility])    4. Irregular menses    5. Encounter for screening mammogram for malignant neoplasm of breast  -     Mammo Screening Digital Tomosynthesis Bilateral With CAD; Future      Patient Instructions   Self breast exam monthly  Regular exercise    Patient will be contacted with the results of endometrial biopsy when available.  Pending results we will discuss further evaluation/management of symptoms and right adnexal complex cystic area.             This note was electronically signed.    Sejal Eduardo PA-C   October 6, 2022

## 2022-10-06 NOTE — PATIENT INSTRUCTIONS
Self breast exam monthly  Regular exercise    Patient will be contacted with the results of endometrial biopsy when available.  Pending results we will discuss further evaluation/management of symptoms and right adnexal complex cystic area.

## 2022-10-07 LAB
REF LAB TEST METHOD: NORMAL
REF LAB TEST METHOD: NORMAL

## 2023-01-04 ENCOUNTER — TRANSCRIBE ORDERS (OUTPATIENT)
Dept: ADMINISTRATIVE | Facility: HOSPITAL | Age: 43
End: 2023-01-04
Payer: COMMERCIAL

## 2023-01-04 DIAGNOSIS — Z12.31 BREAST CANCER SCREENING BY MAMMOGRAM: Primary | ICD-10-CM

## 2023-01-11 ENCOUNTER — OFFICE VISIT (OUTPATIENT)
Dept: GASTROENTEROLOGY | Facility: CLINIC | Age: 43
End: 2023-01-11
Payer: COMMERCIAL

## 2023-01-11 VITALS
WEIGHT: 115 LBS | HEART RATE: 60 BPM | DIASTOLIC BLOOD PRESSURE: 64 MMHG | SYSTOLIC BLOOD PRESSURE: 102 MMHG | HEIGHT: 65 IN | BODY MASS INDEX: 19.16 KG/M2 | OXYGEN SATURATION: 99 % | TEMPERATURE: 97.8 F

## 2023-01-11 DIAGNOSIS — K21.9 GASTROESOPHAGEAL REFLUX DISEASE WITHOUT ESOPHAGITIS: ICD-10-CM

## 2023-01-11 DIAGNOSIS — Z86.010 PERSONAL HISTORY OF COLONIC POLYPS: ICD-10-CM

## 2023-01-11 DIAGNOSIS — K92.9 FUNCTIONAL GASTROINTESTINAL DISORDER: ICD-10-CM

## 2023-01-11 DIAGNOSIS — Z87.891 FORMER SMOKER: ICD-10-CM

## 2023-01-11 DIAGNOSIS — K31.84 GASTROPARESIS: ICD-10-CM

## 2023-01-11 DIAGNOSIS — R63.4 WEIGHT LOSS: Primary | ICD-10-CM

## 2023-01-11 PROCEDURE — 99214 OFFICE O/P EST MOD 30 MIN: CPT | Performed by: NURSE PRACTITIONER

## 2023-01-11 RX ORDER — METOCLOPRAMIDE 5 MG/1
TABLET ORAL
Qty: 30 TABLET | Refills: 1 | Status: SHIPPED | OUTPATIENT
Start: 2023-01-11

## 2023-01-11 NOTE — PROGRESS NOTES
Follow Up Note     Date: 2023   Patient Name: Lina Callahan  MRN: [id number]  : 1980     Primary Care Provider: Sarah Souza DO     Chief Complaint   Patient presents with   • Follow-up   • Weight Loss     History of present illness:   2023  Lina Callahan is a 42 y.o. female who is here today for follow up for weight loss. She has continued to lose some weight, about 9 pounds since her last visit in 2022. She saw her PCP last week and she ordered a lot of labs, but she has not heard results.  She missed her appointment for her chest CT. She continues to have upper abdominal pain    Interval History:  2022  Lina Callahan is a 41 y.o. female who is here today for follow up for weight loss.  She has lost an additional 7 pounds since her last visit that is unintentional. She has been eating smaller meals throughout the day, but is not trying to lose weight. Abdominal pain, bowel habits and nausea have been better. Denies GI bleeding.      2021  She has a history of epigastric pain for the past year that has gradually worsened. Eating makes the pain worse. She has the pain 4-5 days per week. She has associated nausea that is gradually worsening as well. Eating makes nausea worse. She has vomiting once a month on average. No history of hematemesis. She had melena several months ago, but none since. She takes Ibuprofen at least once a month for menstrual pain and occasional headaches. She has been having reflux for the past few weeks that is severe. She has frequent burping. She has not had reflux in the past and is not taking anything for reflux.      She has diarrhea 3-4 times per month that can last all day and is associated with the pain and nausea. She has occasional lower abdominal cramping associated with the diarrhea. No history of rectal bleeding.      She has not had colonoscopy or EGD in the past. There is no family history of GI malignancy or  IBD.    Subjective      Past Medical History:   Diagnosis Date   • Arthritis    • Back pain    • COVID-19 vaccine series completed     Moderna x2   • Ear piercing    • Epigastric pain    • Scoliosis    • Seasonal allergies    • Tattoos      Past Surgical History:   Procedure Laterality Date   • COLONOSCOPY N/A 2022    Procedure: COLONOSCOPY WITH COLD SNARE POLYPECTOMY AND BIOPSY;  Surgeon: Josette Cade MD;  Location: [facility] ENDOSCOPY;  Service: Gastroenterology;  Laterality: N/A;   • DILATATION AND CURETTAGE     • ENDOSCOPY N/A 2022    Procedure: ESOPHAGOGASTRODUODENOSCOPY WITH BIOPSY;  Surgeon: Josette Cdae MD;  Location: [facility] ENDOSCOPY;  Service: Gastroenterology;  Laterality: N/A;   • TUBAL ABDOMINAL LIGATION     • WISDOM TOOTH EXTRACTION      x2     Family History   Problem Relation Age of Onset   • GI problems Father    • Colon cancer Neg Hx    • Liver cancer Neg Hx    • Rectal cancer Neg Hx    • Stomach cancer Neg Hx      Social History     Socioeconomic History   • Marital status:    Tobacco Use   • Smoking status: Former     Packs/day: 0.50     Years: 20.00     Pack years: 10.00     Types: Cigarettes     Quit date: 2022     Years since quittin.6   • Smokeless tobacco: Never   Vaping Use   • Vaping Use: Every day   • Substances: Nicotine, Flavoring   • Devices: Disposable   Substance and Sexual Activity   • Alcohol use: Never   • Drug use: Never   • Sexual activity: Yes     Partners: Male     Birth control/protection: Surgical       Current Outpatient Medications:   •  dicyclomine (BENTYL) 20 MG tablet, Take 1 tablet by mouth 3 (Three) Times a Day As Needed (abdominal pain and diarrhea)., Disp: 30 tablet, Rfl: 1  •  Ginger, Zingiber officinalis, (GINGER PO), Take  by mouth As Needed., Disp: , Rfl:   •  ondansetron ODT (ZOFRAN-ODT) 4 MG disintegrating tablet, Place 1 tablet on the tongue Every 8 (Eight) Hours As Needed for Nausea or Vomiting., Disp: 30 tablet,  "Rfl: 1  •  pantoprazole (PROTONIX) 20 MG EC tablet, 1 po in the am 30 minutes before breakfast., Disp: 30 tablet, Rfl: 5  •  metoclopramide (Reglan) 5 MG tablet, Take 1 tablet twice a day for 2 weeks, then hold for 2 weeks., Disp: 30 tablet, Rfl: 1     No Known Allergies     The following portions of the patient's history were reviewed and updated as appropriate: allergies, current medications, past family history, past medical history, past social history, past surgical history and problem list.  Objective     Physical Exam  Vitals and nursing note reviewed.   Constitutional:       General: She is not in acute distress.     Appearance: Normal appearance. She is well-developed.   HENT:      Head: Normocephalic and atraumatic.      Mouth/Throat:      Mouth: Mucous membranes are not pale, not dry and not cyanotic.   Eyes:      General: Lids are normal.   Neck:      Trachea: Trachea normal.   Cardiovascular:      Rate and Rhythm: Normal rate.   Pulmonary:      Effort: Pulmonary effort is normal. No respiratory distress.      Breath sounds: Normal breath sounds.   Abdominal:      General: Bowel sounds are normal.      Palpations: Abdomen is soft. There is no mass.      Tenderness: There is no abdominal tenderness.      Hernia: No hernia is present.   Skin:     General: Skin is warm and dry.   Neurological:      Mental Status: She is alert and oriented to person, place, and time.   Psychiatric:         Mood and Affect: Mood normal.         Speech: Speech normal.         Behavior: Behavior normal. Behavior is cooperative.       Vitals:    01/11/23 1050   BP: 102/64   Pulse: 60   Temp: 97.8 °F (36.6 °C)   TempSrc: Infrared   SpO2: 99%   Weight: 52.2 kg (115 lb)   Height: 165.1 cm (65\")     Body mass index is 19.14 kg/m².     Results Review:   I reviewed the patient's new clinical results.    No visits with results within 90 Day(s) from this visit.   Latest known visit with results is:      Abdominal ultrasound dated " 6/23/2021   Unremarkable right upper quadrant ultrasound.     Nuclear medicine hepatobiliary scan dated 6/30/2021  Normal ejection fraction of 84%     CT Abdomen Pelvis With Contrast     Result Date: 5/10/2022  1. Possible hydrosalpinx or ovoid ovarian cyst in the right lower quadrant. Endovaginal ultrasound could better evaluate. 2. Appendix not well seen.  This report was signed and finalized on 5/10/2022 10:52 AM by Geraldo Chung MD.     EGD dated 2/28/2022 per Dr. Cade  - Normal oropharynx.  - Z-line regular, 36 cm from the incisors.  - 1 cm hiatal hernia.  - No gross lesions in esophagus.  - Erythematous mucosa in the posterior wall of the stomach, antrum and prepyloric region of the stomach. Biopsied.  - A medium amount of food (residue) in the stomach.  - Normal duodenal bulb, first portion of the duodenum, second portion of the duodenum and third portion of the duodenum. Biopsied.  - Gastric views were inadequate due to food debris  - Findings suggestive of gastroparesis  -Small bowel biopsy unremarkable.  Gastric antrum biopsy with mild reactive gastropathy, no H. pylori.     Colonoscopy dated 5/14/2022 per Dr. Cade  - One 8 mm polyp in the cecum, removed with a cold snare. Resected and retrieved.  - One 4 to 5 mm polyp in the mid sigmoid colon, removed with a cold snare. Resected and retrieved.  - A few diminutive polyps at the recto-sigmoid colon and in the distal sigmoid colon, three of these removed with a cold snare. Resected and retrieved.  - Non-bleeding external and internal hemorrhoids.  - The examined portion of the ileum was normal. Biopsied.  - Biopsies were taken with a cold forceps from the right colon, left colon and transverse colon for evaluation of microscopic colitis.  - No endoscopic signs of colitis or ileitis  - Patient likely has functional GI disorder  A.   SIGMOID COLON POLYPS:   Hyperplastic polyps   B.   TERMINAL ILEUM BIOPSY:   Small intestinal mucosa with no significant  histopathologic abnormalities   C.   CECUM POLYP:   Sessile serrated adenoma   Negative for cytologic dysplasia   D.   COLON, BIOPSIES:   Colonic type mucosa with no significant histopathologic abnormalities    Assessment / Plan      1. Weight loss  2. Former smoker  3. Functional gastrointestinal disorder  4. Gastroparesis  She has lost about 9 pounds in the past 6 months that is unintentional. This makes a total of 32 pounds since November 2021. She forgot to get the CT of the chest done. Her PCP recently ordered more labs to rule out other causes of her weight loss, but she does not have those results yet. She still has epigastric pain and nausea if she eats too much at one time, she had some vomiting after eating Thanksgiving dinner. Denies hematemesis, melena or hematochezia.   Celiac panel negative.  TSH normal.  No history of anemia.  Liver enzymes normal.  Abdominal ultrasound unremarkable.  CTAP with unremarkable GI tract. EGD with findings suggestive of gastroparesis, biopsies unremarkable.  Colonoscopy with polyps removed, otherwise unremarkable.  No endoscopic signs of colitis or ileitis.  Likely functional gastrointestinal disorder.  Etiology of weight loss unclear, no GI cause for weight loss. Hepatitis panel negative. HIV negative.  Continue softer foods with small meals throughout the day.   Trial of Metoclopramide for gastroparesis: 5 mg 1 tablet twice a day x 2 weeks, then hold 2 weeks, then repeat.   CT Chest  TB test  Boost shakes 2 per day.   She is not immune to hepatitis A or B and needs vaccines which may be obtained through the health department or PCP office.     - CT Chest With Contrast Diagnostic; Future  - QuantiFERON TB Gold; Future  - metoclopramide (Reglan) 5 MG tablet; Take 1 tablet twice a day for 2 weeks, then hold for 2 weeks.  Dispense: 30 tablet; Refill: 1    5. Gastroesophageal reflux disease without esophagitis  Reflux reasonably controlled with Pantoprazole 20 mg every other  day. Continue same. Denies difficulty swallowing. EGD with findings suggestive of gastroparesis, no Leon's.  Anti-reflux measures.    6. Personal history of colonic polyps  Colonoscopy with polyps removed, 1 sessile serrated adenoma without dysplasia.  No family history of colon cancer.  Colonoscopy for surveillance in 2029.     Patient Instructions   1. Antireflux measures: Avoid fried, fatty foods, alcohol, chocolate, coffee, tea,  soft drinks, peppermint and spearmint, spicy foods, tomatoes and tomato based foods, onions, peppers, and smoking.   2. Other antireflux measures include weight reduction if overweight, avoiding tight clothing around the abdomen, elevating the head of the bed 6 inches with blocks under the head board, and don't drink or eat before going to bed and avoid lying down immediately after meals.  3. Avoid vaping/smoking.  4. Pantoprazole 20 mg 1 by mouth in the am 30 minutes before breakfast. Try to decrease to use as needed in the future.   5. Zofran 4 mg 1 po every 8 hours as needed for nausea.   6. Avoid all dairy. May use lactose free/dairy free alternatives.   7. Boost shakes 2 per day.   8. Bentyl 20 mg 1 po 3 times per day as needed for lower abdominal pain and diarrhea.  9. The patient should eat 4-5 very small meals throughout the day. Avoid large meals.  10. Trial of Metoclopramide 5 mg 1 tablet twice a day x 2 weeks, then hold for 2 weeks. Discussed possible side effects, patient verbalizes understanding.   11. It is recommended to eat a softer diet. Meats are best consumed ground. Fruits and vegetables are best consumed cooked or steamed and then mashed.  12. The patient is not immune to hepatitis A or hepatitis B and needs vaccines which may be obtained through the health department or PCP office.   13. Labs  14. CT Chest  15. Follow up: 6 months or sooner if needed    Joseph Saucedo, APRN  1/11/2023    Please note that portions of this note were completed with a voice  recognition program.

## 2023-01-11 NOTE — PATIENT INSTRUCTIONS
Antireflux measures: Avoid fried, fatty foods, alcohol, chocolate, coffee, tea,  soft drinks, peppermint and spearmint, spicy foods, tomatoes and tomato based foods, onions, peppers, and smoking.   Other antireflux measures include weight reduction if overweight, avoiding tight clothing around the abdomen, elevating the head of the bed 6 inches with blocks under the head board, and don't drink or eat before going to bed and avoid lying down immediately after meals.  Avoid vaping/smoking.  Pantoprazole 20 mg 1 by mouth in the am 30 minutes before breakfast. Try to decrease to use as needed in the future.   Zofran 4 mg 1 po every 8 hours as needed for nausea.   Avoid all dairy. May use lactose free/dairy free alternatives.   Boost shakes 2 per day.   Bentyl 20 mg 1 po 3 times per day as needed for lower abdominal pain and diarrhea.  The patient should eat 4-5 very small meals throughout the day. Avoid large meals.  Trial of Metoclopramide 5 mg 1 tablet twice a day x 2 weeks, then hold for 2 weeks. Discussed possible side effects, patient verbalizes understanding.   It is recommended to eat a softer diet. Meats are best consumed ground. Fruits and vegetables are best consumed cooked or steamed and then mashed.  The patient is not immune to hepatitis A or hepatitis B and needs vaccines which may be obtained through the health department or PCP office.   Labs  CT Chest  Follow up: 6 months or sooner if needed

## 2023-01-20 ENCOUNTER — OFFICE VISIT (OUTPATIENT)
Dept: OBSTETRICS AND GYNECOLOGY | Facility: CLINIC | Age: 43
End: 2023-01-20
Payer: COMMERCIAL

## 2023-01-20 VITALS
DIASTOLIC BLOOD PRESSURE: 62 MMHG | HEIGHT: 65 IN | BODY MASS INDEX: 19.89 KG/M2 | SYSTOLIC BLOOD PRESSURE: 90 MMHG | WEIGHT: 119.4 LBS

## 2023-01-20 DIAGNOSIS — N95.1 MENOPAUSAL SYMPTOMS: ICD-10-CM

## 2023-01-20 DIAGNOSIS — N83.291 COMPLEX CYST OF RIGHT OVARY: ICD-10-CM

## 2023-01-20 DIAGNOSIS — R61 NIGHT SWEATS: ICD-10-CM

## 2023-01-20 DIAGNOSIS — N92.6 IRREGULAR MENSES: Primary | ICD-10-CM

## 2023-01-20 DIAGNOSIS — R63.4 WEIGHT LOSS: ICD-10-CM

## 2023-01-20 PROCEDURE — 99214 OFFICE O/P EST MOD 30 MIN: CPT | Performed by: PHYSICIAN ASSISTANT

## 2023-01-20 NOTE — PROGRESS NOTES
Subjective   Chief Complaint   Patient presents with   • Consult     Patient would like to discuss symptoms of menopause.  C/O extreme weight loss, anxiety, night sweats, rapid heart rate, insomnia. Would like to have hormone labs done         Lina Callahan is a 42 y.o. year old  presenting to be seen for complaints of possible menopausal symptoms.  Patient reports that she has had unintentional weight loss of 40 pounds and a little over 1 year.  Her menstrual cycles are irregular occurring every 3 to 6 weeks.  Her flow varies between light and heavy but usually last about 4 days.  She has been experiencing night sweats for quite some time.  She is feeling anxiety, insomnia, knee occasional tachycardia.  Reports she has had multiple lab work by her PCP which thus far has been negative.  She has an appointment with cardiology .  She reports a colonoscopy last year which was unremarkable.  She was found to have a small 2 cm slightly complex cyst of right ovary 2021 and follow-up ultrasound noted this to be stable.  A  was obtained which was within normal limits.  Her LMP was 2022  Of note her weight when seen 2022 was 124 pounds    Past Medical History:   Diagnosis Date   • Anxiety -   • Arthritis    • Back pain    • COVID-19 vaccine series completed     Moderna x2   • Ear piercing    • Epigastric pain    • Scoliosis    • Seasonal allergies    • Tattoos    • Varicella     I was a kid and don't remember how old.        Current Outpatient Medications:   •  dicyclomine (BENTYL) 20 MG tablet, Take 1 tablet by mouth 3 (Three) Times a Day As Needed (abdominal pain and diarrhea)., Disp: 30 tablet, Rfl: 1  •  Ginger, Zingiber officinalis, (GINGER PO), Take  by mouth As Needed., Disp: , Rfl:   •  metoclopramide (Reglan) 5 MG tablet, Take 1 tablet twice a day for 2 weeks, then hold for 2 weeks., Disp: 30 tablet, Rfl: 1  •  ondansetron ODT (ZOFRAN-ODT) 4 MG disintegrating tablet,  Place 1 tablet on the tongue Every 8 (Eight) Hours As Needed for Nausea or Vomiting., Disp: 30 tablet, Rfl: 1  •  pantoprazole (PROTONIX) 20 MG EC tablet, 1 po in the am 30 minutes before breakfast., Disp: 30 tablet, Rfl: 5   No Known Allergies   Past Surgical History:   Procedure Laterality Date   • COLONOSCOPY N/A 05/14/2022    Procedure: COLONOSCOPY WITH COLD SNARE POLYPECTOMY AND BIOPSY;  Surgeon: Josette Cade MD;  Location: [facility] ENDOSCOPY;  Service: Gastroenterology;  Laterality: N/A;   • D & C WITH SUCTION     • DILATATION AND CURETTAGE     • ENDOSCOPY N/A 02/28/2022    Procedure: ESOPHAGOGASTRODUODENOSCOPY WITH BIOPSY;  Surgeon: Josette Cade MD;  Location: [facility] ENDOSCOPY;  Service: Gastroenterology;  Laterality: N/A;   • TUBAL ABDOMINAL LIGATION     • WISDOM TOOTH EXTRACTION      x2      Social History     Socioeconomic History   • Marital status:    Tobacco Use   • Smoking status: Every Day     Types: Electronic Cigarette   • Smokeless tobacco: Never   • Tobacco comments:     Stop smoking cigarettes and switched to vapes.   Vaping Use   • Vaping Use: Every day   • Substances: Nicotine, Flavoring   • Devices: Disposable   Substance and Sexual Activity   • Alcohol use: Never   • Drug use: Never   • Sexual activity: Yes     Partners: Male     Birth control/protection: Surgical      Family History   Problem Relation Age of Onset   • GI problems Father    • Breast cancer Maternal Grandmother         Cervical cancer, it's not on the list.   • Breast cancer Maternal Aunt    • Colon cancer Neg Hx    • Liver cancer Neg Hx    • Rectal cancer Neg Hx    • Stomach cancer Neg Hx        Review of Systems   Constitutional: Positive for unexpected weight change. Negative for chills, diaphoresis and fever.        Weight loss   Cardiovascular: Positive for palpitations.   Gastrointestinal: Negative.    Genitourinary: Positive for menstrual problem. Negative for difficulty urinating and  "dysuria.   Psychiatric/Behavioral: Positive for sleep disturbance. The patient is nervous/anxious.            Objective   BP 90/62   Ht 165.1 cm (65\")   Wt 54.2 kg (119 lb 6.4 oz)   LMP 12/26/2022 (Exact Date)   BMI 19.87 kg/m²     Physical Exam       Result Review :            (09/01/2022 17:33)  TSH with Reflex FT4 (01/03/2023 17:05)  US Non-ob Transvaginal (08/31/2022 09:10)          Assessment and Plan  Diagnoses and all orders for this visit:    1. Irregular menses (Primary)  -     Estradiol  -     Follicle Stimulating Hormone  -     Luteinizing Hormone  -     Progesterone  -     Testosterone (Free & Total), LC / MS  -     Thyroid Panel With TSH    2. Night sweats  -     Estradiol  -     Follicle Stimulating Hormone  -     Luteinizing Hormone  -     Progesterone  -     Testosterone (Free & Total), LC / MS  -     Thyroid Panel With TSH    3. Menopausal symptoms  -     Estradiol  -     Follicle Stimulating Hormone  -     Luteinizing Hormone  -     Progesterone  -     Testosterone (Free & Total), LC / MS  -     Thyroid Panel With TSH    4. Weight loss  -     Thyroid Panel With TSH    5. Complex cyst of right ovary  -     US Non-ob Transvaginal      Patient Instructions   Menopause labs checked today  RTO for pelvic ultrasound to recheck previously seen small complext right ovary cyst             This note was electronically signed.    Sejal Eduardo PA-C   January 20, 2023  "

## 2023-01-20 NOTE — PATIENT INSTRUCTIONS
Menopause labs checked today  RTO for pelvic ultrasound to recheck previously seen small complext right ovary cyst

## 2023-01-24 LAB
ESTRADIOL SERPL-MCNC: 43.8 PG/ML
FSH SERPL-ACNC: 13.3 MIU/ML
FT4I SERPL CALC-MCNC: 2 (ref 1.2–4.9)
LH SERPL-ACNC: 7.4 MIU/ML
PROGEST SERPL-MCNC: 0.3 NG/ML
T3RU NFR SERPL: 25 % (ref 24–39)
T4 SERPL-MCNC: 7.8 UG/DL (ref 4.5–12)
TESTOST FREE SERPL-MCNC: 0.6 PG/ML (ref 0–4.2)
TESTOST SERPL-MCNC: 15.4 NG/DL
TSH SERPL DL<=0.005 MIU/L-ACNC: 1.58 UIU/ML (ref 0.45–4.5)

## 2023-01-26 DIAGNOSIS — Z87.891 FORMER SMOKER: ICD-10-CM

## 2023-01-26 DIAGNOSIS — R63.4 WEIGHT LOSS: Primary | ICD-10-CM

## 2023-01-30 ENCOUNTER — LAB (OUTPATIENT)
Dept: LAB | Facility: HOSPITAL | Age: 43
End: 2023-01-30
Payer: COMMERCIAL

## 2023-01-30 ENCOUNTER — HOSPITAL ENCOUNTER (OUTPATIENT)
Dept: GENERAL RADIOLOGY | Facility: HOSPITAL | Age: 43
End: 2023-01-30
Payer: COMMERCIAL

## 2023-01-30 ENCOUNTER — TELEPHONE (OUTPATIENT)
Dept: GASTROENTEROLOGY | Facility: CLINIC | Age: 43
End: 2023-01-30
Payer: COMMERCIAL

## 2023-01-30 DIAGNOSIS — R63.4 WEIGHT LOSS: ICD-10-CM

## 2023-01-30 PROCEDURE — 36415 COLL VENOUS BLD VENIPUNCTURE: CPT

## 2023-01-30 PROCEDURE — 86480 TB TEST CELL IMMUN MEASURE: CPT

## 2023-01-30 NOTE — TELEPHONE ENCOUNTER
----- Message from TERI Gil sent at 1/30/2023  9:17 AM EST -----  She had evidence of gastroparesis on her EGD. She needs to eat a softer diet with 4-5 very small meals throughout the day. Avoid eating only once a day or eating a full meal at one time as this will make her symptoms worse. This is why she is having those symptoms, including burping. The Metaclopramide is the treatment for gastroparesis. If symptoms are severe and worsening, go to the ER for evaluation.    ----- Message -----  From: Diamond Warren MA  Sent: 1/27/2023   3:32 PM EST  To: TERI Gil    Patient called today stating that she is having a lot of issues with her stomach. She states that the food is not digesting and she is unable to eat much. She complains of rancid burps. She has not yet tried the Metoclopramide. She states that she has other appointments coming up that she wanted to go to before trying it. She said that she is following all other antireflux measures and taking the rest of her medications. Would like to know what to do

## 2023-02-02 LAB
GAMMA INTERFERON BACKGROUND BLD IA-ACNC: 0.08 IU/ML
M TB IFN-G BLD-IMP: NEGATIVE
M TB IFN-G CD4+ T-CELLS BLD-ACNC: 0.12 IU/ML
M TBIFN-G CD4+ CD8+T-CELLS BLD-ACNC: 0.12 IU/ML
MITOGEN IGNF BLD-ACNC: >10 IU/ML
QUANTIFERON INCUBATION: NORMAL
SERVICE CMNT-IMP: NORMAL

## 2023-02-06 ENCOUNTER — HOSPITAL ENCOUNTER (OUTPATIENT)
Dept: GENERAL RADIOLOGY | Facility: HOSPITAL | Age: 43
Discharge: HOME | End: 2023-02-06
Admitting: NURSE PRACTITIONER
Payer: COMMERCIAL

## 2023-02-06 DIAGNOSIS — R63.4 WEIGHT LOSS: ICD-10-CM

## 2023-02-06 DIAGNOSIS — Z87.891 FORMER SMOKER: ICD-10-CM

## 2023-02-06 PROCEDURE — 71046 X-RAY EXAM CHEST 2 VIEWS: CPT

## 2023-02-21 ENCOUNTER — DOCUMENTATION (OUTPATIENT)
Dept: OBSTETRICS AND GYNECOLOGY | Facility: CLINIC | Age: 43
End: 2023-02-21
Payer: COMMERCIAL

## 2023-02-21 NOTE — PROGRESS NOTES
Patient is informed of results of pelvic ultrasound today.  Uterus is normal.  Left ovary is normal.  Right ovary has a 2 cm simple cyst.  Both ovaries have vascular flow.  Previous small complex cyst of the right ovary has resolved.  She reports having a normal menstrual cycle 2/14/2023.  As previous small complex cyst has resolved do not recommend further imaging at this time.  She is advised to call or return to the office as needed.

## 2023-03-27 ENCOUNTER — HOSPITAL ENCOUNTER (OUTPATIENT)
Dept: MAMMOGRAPHY | Facility: HOSPITAL | Age: 43
Discharge: HOME | End: 2023-03-27
Admitting: NURSE PRACTITIONER
Payer: COMMERCIAL

## 2023-03-27 DIAGNOSIS — Z12.31 BREAST CANCER SCREENING BY MAMMOGRAM: ICD-10-CM

## 2023-03-27 PROCEDURE — 77063 BREAST TOMOSYNTHESIS BI: CPT

## 2023-03-27 PROCEDURE — 77067 SCR MAMMO BI INCL CAD: CPT

## 2023-05-04 ENCOUNTER — OFFICE VISIT (OUTPATIENT)
Dept: OBSTETRICS AND GYNECOLOGY | Facility: CLINIC | Age: 43
End: 2023-05-04
Payer: COMMERCIAL

## 2023-05-04 VITALS
BODY MASS INDEX: 20.29 KG/M2 | WEIGHT: 121.8 LBS | HEIGHT: 65 IN | DIASTOLIC BLOOD PRESSURE: 68 MMHG | SYSTOLIC BLOOD PRESSURE: 102 MMHG

## 2023-05-04 DIAGNOSIS — Z87.42 HISTORY OF OVARIAN CYST: ICD-10-CM

## 2023-05-04 DIAGNOSIS — R10.2 PELVIC PAIN: Primary | ICD-10-CM

## 2023-05-04 PROCEDURE — 99213 OFFICE O/P EST LOW 20 MIN: CPT | Performed by: PHYSICIAN ASSISTANT

## 2023-05-04 RX ORDER — DROSPIRENONE 4 MG/1
1 TABLET, FILM COATED ORAL DAILY
Qty: 28 TABLET | Refills: 6 | Status: SHIPPED | OUTPATIENT
Start: 2023-05-04

## 2023-05-04 NOTE — PROGRESS NOTES
Subjective   Chief Complaint   Patient presents with   • Pelvic Pain     C/O severe pelvic pain on right side       Lina Callahan is a 42 y.o. year old  presenting to be seen for right sided pelvic pain.  Patient reports her most recent bout of right lower quadrant pain started this past Tuesday.  It initially was very intense the first couple of hours that she took ibuprofen and went to bed.  The pain has eased up considerably and feels somewhat crampy at this point.  This pain onset around the same time as her menses began 2023.  She has had a tubal ligation.  She has a history of ovarian cysts that have resolved spontaneously.  Her latest pelvic ultrasound with 2023 when she was noted to have a 2 cm simple right ovarian cyst.  No fever or chills.  No nausea or vomiting.  No urinary symptoms.    Past Medical History:   Diagnosis Date   • Anxiety    • Arthritis    • Back pain    • COVID-19 vaccine series completed     Moderna x2   • Ear piercing    • Epigastric pain    • Scoliosis    • Seasonal allergies    • Tattoos    • Varicella     I was a kid and don't remember how old.        Current Outpatient Medications:   •  dicyclomine (BENTYL) 20 MG tablet, Take 1 tablet by mouth 3 (Three) Times a Day As Needed (abdominal pain and diarrhea)., Disp: 30 tablet, Rfl: 1  •  Ginger, Zingiber officinalis, (GINGER PO), Take  by mouth As Needed., Disp: , Rfl:   •  metoclopramide (Reglan) 5 MG tablet, Take 1 tablet twice a day for 2 weeks, then hold for 2 weeks., Disp: 30 tablet, Rfl: 1  •  ondansetron ODT (ZOFRAN-ODT) 4 MG disintegrating tablet, Place 1 tablet on the tongue Every 8 (Eight) Hours As Needed for Nausea or Vomiting., Disp: 30 tablet, Rfl: 1  •  pantoprazole (PROTONIX) 20 MG EC tablet, 1 po in the am 30 minutes before breakfast., Disp: 30 tablet, Rfl: 5  •  Drospirenone (Slynd) 4 MG tablet, Take 1 tablet by mouth Daily., Disp: 28 tablet, Rfl: 6   No Known Allergies   Past Surgical  "History:   Procedure Laterality Date   • COLONOSCOPY N/A 05/14/2022    Procedure: COLONOSCOPY WITH COLD SNARE POLYPECTOMY AND BIOPSY;  Surgeon: Josette Cade MD;  Location: [facility] ENDOSCOPY;  Service: Gastroenterology;  Laterality: N/A;   • D & C WITH SUCTION     • DILATATION AND CURETTAGE     • ENDOSCOPY N/A 02/28/2022    Procedure: ESOPHAGOGASTRODUODENOSCOPY WITH BIOPSY;  Surgeon: Josette Cade MD;  Location: [facility] ENDOSCOPY;  Service: Gastroenterology;  Laterality: N/A;   • TUBAL ABDOMINAL LIGATION     • WISDOM TOOTH EXTRACTION      x2      Social History     Socioeconomic History   • Marital status:    Tobacco Use   • Smoking status: Every Day     Types: Electronic Cigarette   • Smokeless tobacco: Never   • Tobacco comments:     Stop smoking cigarettes and switched to vapes.   Vaping Use   • Vaping Use: Every day   • Substances: Nicotine, Flavoring   • Devices: Disposable   Substance and Sexual Activity   • Alcohol use: Never   • Drug use: Never   • Sexual activity: Yes     Partners: Male     Birth control/protection: Surgical, Tubal ligation      Family History   Problem Relation Age of Onset   • GI problems Father    • Breast cancer Maternal Grandmother         Cervical cancer, it's not on the list.   • Breast cancer Maternal Aunt    • Colon cancer Neg Hx    • Liver cancer Neg Hx    • Rectal cancer Neg Hx    • Stomach cancer Neg Hx        Review of Systems   Constitutional: Negative for chills, diaphoresis and fever.   Gastrointestinal: Negative for constipation, diarrhea, nausea and vomiting.   Genitourinary: Positive for pelvic pain. Negative for difficulty urinating, dysuria and menstrual problem.           Objective   /68   Ht 165.1 cm (65\")   Wt 55.2 kg (121 lb 12.8 oz)   LMP 05/02/2023 (Exact Date)   BMI 20.27 kg/m²     Physical Exam  Constitutional:       Appearance: Normal appearance. She is well-developed and well-groomed.   Eyes:      General: Lids are " normal.      Extraocular Movements: Extraocular movements intact.      Conjunctiva/sclera: Conjunctivae normal.   Genitourinary:     Labia:         Right: No rash, tenderness or lesion.         Left: No rash, tenderness or lesion.       Urethra: No prolapse, urethral pain, urethral swelling or urethral lesion.      Vagina: No vaginal discharge, tenderness or lesions.      Cervix: No cervical motion tenderness, discharge, friability or lesion.      Uterus: Not enlarged and not tender.       Adnexa:         Right: No mass, tenderness or fullness.          Left: No mass, tenderness or fullness.     Neurological:      Mental Status: She is alert.   Psychiatric:         Attention and Perception: Attention normal.         Mood and Affect: Mood normal.         Speech: Speech normal.         Behavior: Behavior is cooperative.            Result Review :            Non-ob Transvaginal (02/21/2023 09:03)  US Non-ob Transvaginal (08/31/2022 09:10)          Assessment and Plan  Diagnoses and all orders for this visit:    1. Pelvic pain (Primary)    2. History of ovarian cyst    Other orders  -     Drospirenone (Slynd) 4 MG tablet; Take 1 tablet by mouth Daily.  Dispense: 28 tablet; Refill: 6      Patient Instructions   Patient is reassured her pelvic exam is unremarkable today.  Suspect she is having recurrent small ovarian cyst that are triggering the pain.  She is offered the option of hormonal birth control to suppress ovaries and ovulation.  After discussion of all options she would like to try a minipill.  She may start this with her current cycle.  Follow-up in 3 months or as needed.             This note was electronically signed.    Sejal Eduardo PA-C   May 4, 2023

## 2023-05-04 NOTE — PATIENT INSTRUCTIONS
Patient is reassured her pelvic exam is unremarkable today.  Suspect she is having recurrent small ovarian cyst that are triggering the pain.  She is offered the option of hormonal birth control to suppress ovaries and ovulation.  After discussion of all options she would like to try a minipill.  She may start this with her current cycle.  Follow-up in 3 months or as needed.

## 2023-11-08 ENCOUNTER — OFFICE VISIT (OUTPATIENT)
Dept: OBSTETRICS AND GYNECOLOGY | Facility: CLINIC | Age: 43
End: 2023-11-08
Payer: COMMERCIAL

## 2023-11-08 VITALS
DIASTOLIC BLOOD PRESSURE: 72 MMHG | WEIGHT: 130 LBS | BODY MASS INDEX: 21.66 KG/M2 | SYSTOLIC BLOOD PRESSURE: 112 MMHG | HEIGHT: 65 IN

## 2023-11-08 DIAGNOSIS — Z30.41 ENCOUNTER FOR SURVEILLANCE OF CONTRACEPTIVE PILLS: Primary | ICD-10-CM

## 2023-11-08 DIAGNOSIS — Z87.42 HISTORY OF OVARIAN CYST: ICD-10-CM

## 2023-11-08 PROCEDURE — 99213 OFFICE O/P EST LOW 20 MIN: CPT | Performed by: PHYSICIAN ASSISTANT

## 2023-11-08 RX ORDER — ACETAMINOPHEN AND CODEINE PHOSPHATE 120; 12 MG/5ML; MG/5ML
1 SOLUTION ORAL DAILY
Qty: 28 TABLET | Refills: 12 | Status: SHIPPED | OUTPATIENT
Start: 2023-11-08 | End: 2024-11-07

## 2023-11-08 RX ORDER — DROSPIRENONE AND ETHINYL ESTRADIOL 0.02-3(28)
1 KIT ORAL DAILY
COMMUNITY
Start: 2023-10-26 | End: 2023-11-08 | Stop reason: ALTCHOICE

## 2023-11-08 NOTE — PROGRESS NOTES
Subjective   Chief Complaint   Patient presents with    Follow-up     3 month follow-up on oral contraceptives.  Doing well       Lina Callahan is a 43 y.o. year old  presenting to be seen for follow up ocp  Patient had been seen in May for pelvic pain and suspected ovarian cyst.  She elected to try OCP to try and improve her symptoms.  She was prescribed progesterone only Slynd as she is a smoker, however the pharmacy has given her Anni OCPs.  Patient reports that her pelvic pain has improved.  She has done very well since being on the OCP.  Her LMP was 10/23/2023.  She is counseled regarding smoking, her age and estrogen containing OCP.  Recommend she switch to progesterone only OCP and patient voices understanding      Past Medical History:   Diagnosis Date    Anxiety     Arthritis     Back pain     COVID-19 vaccine series completed     Moderna x2    Ear piercing     Epigastric pain     Scoliosis     Seasonal allergies     Tattoos     Varicella     I was a kid and don't remember how old.        Current Outpatient Medications:     dicyclomine (BENTYL) 20 MG tablet, Take 1 tablet by mouth 3 (Three) Times a Day As Needed (abdominal pain and diarrhea)., Disp: 30 tablet, Rfl: 1    Ginger, Zingiber officinalis, (GINGER PO), Take  by mouth As Needed., Disp: , Rfl:     metoclopramide (Reglan) 5 MG tablet, Take 1 tablet twice a day for 2 weeks, then hold for 2 weeks., Disp: 30 tablet, Rfl: 1    ondansetron ODT (ZOFRAN-ODT) 4 MG disintegrating tablet, Place 1 tablet on the tongue Every 8 (Eight) Hours As Needed for Nausea or Vomiting., Disp: 30 tablet, Rfl: 1    pantoprazole (PROTONIX) 20 MG EC tablet, TAKE TAKE 1 TABLET BY MOUTH IN THE AM 30 MINUTES BEFORE BREAKFAST., Disp: 30 tablet, Rfl: 4    norethindrone (MICRONOR) 0.35 MG tablet, Take 1 tablet by mouth Daily., Disp: 28 tablet, Rfl: 12   No Known Allergies   Past Surgical History:   Procedure Laterality Date    COLONOSCOPY N/A 2022     "Procedure: COLONOSCOPY WITH COLD SNARE POLYPECTOMY AND BIOPSY;  Surgeon: Josette Cade MD;  Location: [facility] ENDOSCOPY;  Service: Gastroenterology;  Laterality: N/A;    D & C WITH SUCTION      DILATATION AND CURETTAGE      ENDOSCOPY N/A 02/28/2022    Procedure: ESOPHAGOGASTRODUODENOSCOPY WITH BIOPSY;  Surgeon: Josette Cade MD;  Location: [facility] ENDOSCOPY;  Service: Gastroenterology;  Laterality: N/A;    TUBAL ABDOMINAL LIGATION      WISDOM TOOTH EXTRACTION      x2      Social History     Socioeconomic History    Marital status:    Tobacco Use    Smoking status: Every Day     Packs/day: 0.50     Years: 0.50     Additional pack years: 0.00     Total pack years: 0.25     Types: Cigarettes, Electronic Cigarette    Smokeless tobacco: Never    Tobacco comments:     Stop smoking cigarettes and switched to vapes.   Vaping Use    Vaping Use: Every day    Substances: Nicotine, Flavoring    Devices: Disposable   Substance and Sexual Activity    Alcohol use: Not Currently    Drug use: Never    Sexual activity: Yes     Partners: Male     Birth control/protection: Tubal ligation, Pill, Surgical      Family History   Problem Relation Age of Onset    GI problems Father     Breast cancer Maternal Grandmother         Cervical cancer    Breast cancer Maternal Aunt     Stroke Maternal Grandfather     Colon cancer Neg Hx     Liver cancer Neg Hx     Rectal cancer Neg Hx     Stomach cancer Neg Hx        Review of Systems   Constitutional:  Negative for chills, diaphoresis and fever.   Gastrointestinal:  Negative for constipation, diarrhea, nausea and vomiting.   Genitourinary:  Negative for difficulty urinating, dysuria, menstrual problem, pelvic pain and vaginal bleeding.           Objective   /72   Ht 165.1 cm (65\")   Wt 59 kg (130 lb)   LMP 10/23/2023 (Approximate)   BMI 21.63 kg/m²     Physical Exam  Constitutional:       Appearance: Normal appearance. She is well-developed and well-groomed. "   Eyes:      General: Lids are normal.      Extraocular Movements: Extraocular movements intact.      Conjunctiva/sclera: Conjunctivae normal.   Neurological:      General: No focal deficit present.      Mental Status: She is alert and oriented to person, place, and time.   Psychiatric:         Attention and Perception: Attention normal.         Mood and Affect: Mood normal.         Speech: Speech normal.         Behavior: Behavior is cooperative.            Result Review :           LIQUID-BASED PAP SMEAR, [facility]) (10/06/2022 15:40)         Assessment and Plan  Diagnoses and all orders for this visit:    1. Encounter for surveillance of contraceptive pills (Primary)    2. History of ovarian cyst    Other orders  -     norethindrone (MICRONOR) 0.35 MG tablet; Take 1 tablet by mouth Daily.  Dispense: 28 tablet; Refill: 12      Patient Instructions   Will switch to Micronor with next pack  Follow up 3 months for pap/annual and review ocp            This note was electronically signed.    Sejal Eduardo PA-C   November 8, 2023

## 2024-02-07 ENCOUNTER — TRANSCRIBE ORDERS (OUTPATIENT)
Dept: ADMINISTRATIVE | Facility: HOSPITAL | Age: 44
End: 2024-02-07
Payer: COMMERCIAL

## 2024-02-07 DIAGNOSIS — Z12.31 BREAST CANCER SCREENING BY MAMMOGRAM: Primary | ICD-10-CM

## 2024-02-12 ENCOUNTER — OFFICE VISIT (OUTPATIENT)
Dept: OBSTETRICS AND GYNECOLOGY | Facility: CLINIC | Age: 44
End: 2024-02-12
Payer: COMMERCIAL

## 2024-02-12 VITALS
HEIGHT: 65 IN | DIASTOLIC BLOOD PRESSURE: 62 MMHG | WEIGHT: 138 LBS | SYSTOLIC BLOOD PRESSURE: 104 MMHG | BODY MASS INDEX: 22.99 KG/M2

## 2024-02-12 DIAGNOSIS — Z12.4 SCREENING FOR CERVICAL CANCER: ICD-10-CM

## 2024-02-12 DIAGNOSIS — Z01.419 ROUTINE GYNECOLOGICAL EXAMINATION: Primary | ICD-10-CM

## 2024-02-12 DIAGNOSIS — N92.1 BREAKTHROUGH BLEEDING ON OCPS: ICD-10-CM

## 2024-02-12 PROCEDURE — 99396 PREV VISIT EST AGE 40-64: CPT | Performed by: PHYSICIAN ASSISTANT

## 2024-02-12 PROCEDURE — 99459 PELVIC EXAMINATION: CPT | Performed by: PHYSICIAN ASSISTANT

## 2024-02-16 LAB — REF LAB TEST METHOD: NORMAL

## 2024-05-14 ENCOUNTER — HOSPITAL ENCOUNTER (OUTPATIENT)
Dept: MAMMOGRAPHY | Facility: HOSPITAL | Age: 44
Discharge: HOME | End: 2024-05-14
Admitting: PHYSICIAN ASSISTANT
Payer: COMMERCIAL

## 2024-05-14 DIAGNOSIS — Z12.31 BREAST CANCER SCREENING BY MAMMOGRAM: ICD-10-CM

## 2024-05-14 PROCEDURE — 77067 SCR MAMMO BI INCL CAD: CPT

## 2024-05-14 PROCEDURE — 77063 BREAST TOMOSYNTHESIS BI: CPT

## 2024-05-28 DIAGNOSIS — K21.9 GASTROESOPHAGEAL REFLUX DISEASE WITHOUT ESOPHAGITIS: Chronic | ICD-10-CM

## 2024-05-29 RX ORDER — PANTOPRAZOLE SODIUM 20 MG/1
TABLET, DELAYED RELEASE ORAL
Qty: 30 TABLET | Refills: 1 | Status: SHIPPED | OUTPATIENT
Start: 2024-05-29

## 2024-07-15 ENCOUNTER — OFFICE VISIT (OUTPATIENT)
Dept: GASTROENTEROLOGY | Facility: CLINIC | Age: 44
End: 2024-07-15
Payer: COMMERCIAL

## 2024-07-15 VITALS
WEIGHT: 145 LBS | BODY MASS INDEX: 24.13 KG/M2 | HEART RATE: 77 BPM | SYSTOLIC BLOOD PRESSURE: 112 MMHG | DIASTOLIC BLOOD PRESSURE: 78 MMHG | OXYGEN SATURATION: 98 %

## 2024-07-15 DIAGNOSIS — K21.9 GASTROESOPHAGEAL REFLUX DISEASE WITHOUT ESOPHAGITIS: Chronic | ICD-10-CM

## 2024-07-15 DIAGNOSIS — R19.7 DIARRHEA, UNSPECIFIED TYPE: Chronic | ICD-10-CM

## 2024-07-15 DIAGNOSIS — K31.84 GASTROPARESIS: ICD-10-CM

## 2024-07-15 DIAGNOSIS — K92.9 FUNCTIONAL GASTROINTESTINAL DISORDER: Primary | Chronic | ICD-10-CM

## 2024-07-15 DIAGNOSIS — K59.00 CONSTIPATION, UNSPECIFIED CONSTIPATION TYPE: Chronic | ICD-10-CM

## 2024-07-15 DIAGNOSIS — Z86.010 PERSONAL HISTORY OF COLONIC POLYPS: ICD-10-CM

## 2024-07-15 PROBLEM — K92.1 MELENA: Status: RESOLVED | Noted: 2021-08-17 | Resolved: 2024-07-15

## 2024-07-15 PROCEDURE — 99214 OFFICE O/P EST MOD 30 MIN: CPT | Performed by: NURSE PRACTITIONER

## 2024-07-15 RX ORDER — POLYETHYLENE GLYCOL 1450
POWDER (GRAM) MISCELLANEOUS
Qty: 500 G | Refills: 3 | Status: SHIPPED | OUTPATIENT
Start: 2024-07-15

## 2024-07-15 RX ORDER — PANTOPRAZOLE SODIUM 20 MG/1
TABLET, DELAYED RELEASE ORAL
Qty: 90 TABLET | Refills: 1 | Status: SHIPPED | OUTPATIENT
Start: 2024-07-15

## 2024-07-15 RX ORDER — ASPIRIN 81 MG
100 TABLET, DELAYED RELEASE (ENTERIC COATED) ORAL 2 TIMES DAILY PRN
Qty: 60 TABLET | Refills: 3 | Status: SHIPPED | OUTPATIENT
Start: 2024-07-15

## 2024-07-15 NOTE — PROGRESS NOTES
Follow Up Note     Date: 07/15/2024   Patient Name: Lina Callahan  MRN: [id number]  : 1980     Primary Care Provider: Sarah Souza DO     Chief Complaint   Patient presents with    Functional gastrointestinal disorder     History of present illness:   7/15/2024  Lina Callahan is a 43 y.o. female who is here today for follow up for functional GI disorder. Since being started on oral contraceptives, she has been having some constipation. She has increased her fiber but it caused diarrhea. She had more constipation since she was a child than diarrhea. She has ran out of Pantoprazole about a week ago and since then her reflux has returned. She was only taking it every other day. No history of GI bleeding.     Interval History:  2023  Lina Callahan is a 42 y.o. female who is here today for follow up for weight loss. She has gained about 5 pounds since her last visit. She had been doing well until she ate a tater tot with w[employer] Dishle about a week and a half ago. She had a lot of nausea and vomited once after she ate it, symptoms lasted 5 days then resolved. Denies any hematemesis, hematochezia or melena. She is not taking the Metoclopramide on a regular basis due to possible side effects, only takes it if her symptoms are severe, maybe 2-3 times, and it does help.      2021  She has a history of epigastric pain for the past year that has gradually worsened. Eating makes the pain worse. She has the pain 4-5 days per week. She has associated nausea that is gradually worsening as well. Eating makes nausea worse. She has vomiting once a month on average. No history of hematemesis. She had melena several months ago, but none since. She takes Ibuprofen at least once a month for menstrual pain and occasional headaches. She has been having reflux for the past few weeks that is severe. She has frequent burping. She has not had reflux in the past and is not taking anything for  reflux.      She has diarrhea 3-4 times per month that can last all day and is associated with the pain and nausea. She has occasional lower abdominal cramping associated with the diarrhea. No history of rectal bleeding.      She has not had colonoscopy or EGD in the past. There is no family history of GI malignancy or IBD.    Subjective      Past Medical History:   Diagnosis Date    Anxiety     Arthritis     Back pain     COVID-19 vaccine series completed     Moderna x2    Ear piercing     Epigastric pain     Scoliosis     Seasonal allergies     Tattoos     Varicella     I was a kid and don't remember how old.     Past Surgical History:   Procedure Laterality Date    COLONOSCOPY N/A 05/14/2022    Procedure: COLONOSCOPY WITH COLD SNARE POLYPECTOMY AND BIOPSY;  Surgeon: Josette Cade MD;  Location: [facility] ENDOSCOPY;  Service: Gastroenterology;  Laterality: N/A;    D & C WITH SUCTION      DILATATION AND CURETTAGE      ENDOSCOPY N/A 02/28/2022    Procedure: ESOPHAGOGASTRODUODENOSCOPY WITH BIOPSY;  Surgeon: Josette Cade MD;  Location: [facility] ENDOSCOPY;  Service: Gastroenterology;  Laterality: N/A;    TUBAL ABDOMINAL LIGATION      WISDOM TOOTH EXTRACTION      x2     Family History   Problem Relation Age of Onset    GI problems Father     Breast cancer Maternal Grandmother         Cervical cancer    Breast cancer Maternal Aunt     Stroke Maternal Grandfather     Colon cancer Neg Hx     Liver cancer Neg Hx     Rectal cancer Neg Hx     Stomach cancer Neg Hx      Social History     Socioeconomic History    Marital status:    Tobacco Use    Smoking status: Former     Current packs/day: 0.50     Average packs/day: 0.5 packs/day for 0.5 years (0.3 ttl pk-yrs)     Types: Cigarettes, Electronic Cigarette    Smokeless tobacco: Never    Tobacco comments:     Stop smoking cigarettes and switched to vapes.   Vaping Use    Vaping status: Every Day    Substances: Nicotine, Flavoring    Devices:  Disposable   Substance and Sexual Activity    Alcohol use: Not Currently    Drug use: Never    Sexual activity: Yes     Partners: Male     Birth control/protection: Tubal ligation, Pill, Surgical       Current Outpatient Medications:     dicyclomine (BENTYL) 20 MG tablet, Take 1 tablet by mouth 3 (Three) Times a Day As Needed (abdominal pain and diarrhea)., Disp: 30 tablet, Rfl: 1    Ginger, Zingiber officinalis, (GINGER PO), Take  by mouth As Needed., Disp: , Rfl:     metoclopramide (Reglan) 5 MG tablet, Take 1 tablet twice a day for 2 weeks, then hold for 2 weeks., Disp: 30 tablet, Rfl: 1    norethindrone (MICRONOR) 0.35 MG tablet, Take 1 tablet by mouth Daily., Disp: 28 tablet, Rfl: 12    ondansetron ODT (ZOFRAN-ODT) 4 MG disintegrating tablet, Place 1 tablet on the tongue Every 8 (Eight) Hours As Needed for Nausea or Vomiting., Disp: 30 tablet, Rfl: 1    pantoprazole (PROTONIX) 20 MG EC tablet, TAKE TAKE 1 TABLET BY MOUTH IN THE AM 30 MINUTES BEFORE BREAKFAST., Disp: 90 tablet, Rfl: 1    Docusate Sodium 100 MG capsule, Take 1 tablet by mouth 2 (Two) Times a Day As Needed for Constipation., Disp: 60 tablet, Rfl: 3    Polyethylene Glycol powder, Take 1 cap full (17 grams) in 8 oz of noncarbonated beverage and drink once daily, Disp: 500 g, Rfl: 3    No Known Allergies    The following portions of the patient's history were reviewed and updated as appropriate: allergies, current medications, past family history, past medical history, past social history, past surgical history and problem list.  Objective     Physical Exam  Vitals and nursing note reviewed.   Constitutional:       General: She is not in acute distress.     Appearance: Normal appearance. She is well-developed.   HENT:      Head: Normocephalic and atraumatic.      Mouth/Throat:      Mouth: Mucous membranes are not pale, not dry and not cyanotic.   Eyes:      General: Lids are normal.   Neck:      Trachea: Trachea normal.   Cardiovascular:      Rate  and Rhythm: Normal rate.   Pulmonary:      Effort: Pulmonary effort is normal. No respiratory distress.      Breath sounds: Normal breath sounds.   Abdominal:      General: Bowel sounds are normal.      Palpations: Abdomen is soft.      Tenderness: There is no abdominal tenderness.   Skin:     General: Skin is warm and dry.   Neurological:      Mental Status: She is alert and oriented to person, place, and time.   Psychiatric:         Mood and Affect: Mood normal.         Speech: Speech normal.         Behavior: Behavior normal. Behavior is cooperative.       Vitals:    07/15/24 1030   BP: 112/78   Pulse: 77   SpO2: 98%   Weight: 65.8 kg (145 lb)     Body mass index is 24.13 kg/m².     Results Review:   I reviewed the patient's new clinical results.    CBC with Auto Diff (08/23/2023 17:37)  COMPREHENSIVE METABOLIC PANEL (08/23/2023 17:37)  TSH with Reflex FT4 (08/22/2023 16:12)     Abdominal ultrasound dated 6/23/2021   Unremarkable right upper quadrant ultrasound.     Nuclear medicine hepatobiliary scan dated 6/30/2021  Normal ejection fraction of 84%     CT Abdomen Pelvis With Contrast     Result Date: 5/10/2022  1. Possible hydrosalpinx or ovoid ovarian cyst in the right lower quadrant. Endovaginal ultrasound could better evaluate. 2. Appendix not well seen.       XR Chest PA and Lateral     2/6/2023  No acute cardiopulmonary process.      CTAP wo contrast 8/23/2023  Urinary bladder wall thickening. Correlate with urinalysis.   Unenhanced liver is grossly unremarkable without suspicious focal abnormality. Unremarkable gallbladder. No definite biliary ductal dilatation.   There is no evidence of bowel obstruction. The appendix is not well-visualized but there is no significant inflammation in the right lower quadrant. No significant mesenteric inflammation.     KUB 9/11/2023  Nonspecific bowel gas pattern.     EGD dated 2/28/2022 per Dr. Cade  - Normal oropharynx.  - Z-line regular, 36 cm from the incisors.  -  1 cm hiatal hernia.  - No gross lesions in esophagus.  - Erythematous mucosa in the posterior wall of the stomach, antrum and prepyloric region of the stomach. Biopsied.  - A medium amount of food (residue) in the stomach.  - Normal duodenal bulb, first portion of the duodenum, second portion of the duodenum and third portion of the duodenum. Biopsied.  - Gastric views were inadequate due to food debris  - Findings suggestive of gastroparesis  -Small bowel biopsy unremarkable.  Gastric antrum biopsy with mild reactive gastropathy, no H. pylori.     Colonoscopy dated 5/14/2022 per Dr. Cade  - One 8 mm polyp in the cecum, removed with a cold snare. Resected and retrieved.  - One 4 to 5 mm polyp in the mid sigmoid colon, removed with a cold snare. Resected and retrieved.  - A few diminutive polyps at the recto-sigmoid colon and in the distal sigmoid colon, three of these removed with a cold snare. Resected and retrieved.  - Non-bleeding external and internal hemorrhoids.  - The examined portion of the ileum was normal. Biopsied.  - Biopsies were taken with a cold forceps from the right colon, left colon and transverse colon for evaluation of microscopic colitis.  - No endoscopic signs of colitis or ileitis  - Patient likely has functional GI disorder  A.   SIGMOID COLON POLYPS:   Hyperplastic polyps   B.   TERMINAL ILEUM BIOPSY:   Small intestinal mucosa with no significant histopathologic abnormalities   C.   CECUM POLYP:   Sessile serrated adenoma   Negative for cytologic dysplasia   D.   COLON, BIOPSIES:   Colonic type mucosa with no significant histopathologic abnormalities      Assessment / Plan      1. Functional gastrointestinal disorder  2. Constipation, unspecified constipation type  3. Diarrhea, unspecified type  4. Gastroesophageal reflux disease without esophagitis  5. Gastroparesis  Symptoms were doing well until she started oral contraceptive, she has had some mild constipation since then.  She has  increased fiber, but this caused some diarrhea.  She denies diarrhea episodes at any other time.  She ran out of her PPI and diarrhea returned.  She was only taking it every other day.  No trouble swallowing.  No nausea or vomiting at this time.  Denies GI bleeding.  No further weight loss, she has gained 19 pounds since her last visit one year ago per records.  Basic labs unremarkable. TSH normal.  Celiac panel negative.  MARIUM negative. B12 level normal.  Abdominal ultrasound unremarkable.  CTAP with unremarkable GI tract. EGD with findings suggestive of gastroparesis, biopsies unremarkable.  Colonoscopy with polyps removed, otherwise unremarkable.  No endoscopic signs of colitis or ileitis. Hepatitis panel negative. HIV negative. TB test negative. Likely functional gastrointestinal disorder.      Anti-reflux measures.   Continue Pantoprazole 20 mg daily/every other day.   Zofran as needed for nausea.   Advised to eat a softer diet 4-5 very small meals throughout the day.  Low-fiber, low-fat diet with liberal water intake. May use soluble fiber.  Metamucil or fiber Gummies daily.  May take MiraLAX or stool softeners as needed for constipation.  Low FODMAP diet-avoid all dairy.    - Polyethylene Glycol powder; Take 1 cap full (17 grams) in 8 oz of noncarbonated beverage and drink once daily  Dispense: 500 g; Refill: 3  - Docusate Sodium 100 MG capsule; Take 1 tablet by mouth 2 (Two) Times a Day As Needed for Constipation.  Dispense: 60 tablet; Refill: 3  - pantoprazole (PROTONIX) 20 MG EC tablet; TAKE TAKE 1 TABLET BY MOUTH IN THE AM 30 MINUTES BEFORE BREAKFAST.  Dispense: 90 tablet; Refill: 1    6. Personal history of colonic polyps  Colonoscopy 5/14/2022 with polyps removed, 1 sessile serrated adenoma without dysplasia.  No family history of colon cancer.   Colonoscopy for surveillance in 2029 per current ACG guidelines.    Patient Instructions   Antireflux measures: Avoid fried, fatty foods, alcohol, chocolate,  coffee, tea,  soft drinks, all carbonated beverages (including sparkling water), peppermint and spearmint, spicy foods, tomatoes and tomato based foods, onions, peppers, and garlic.   Other antireflux measures include weight reduction if overweight, avoiding tight clothing around the abdomen, elevating the head of the bed 6 inches with blocks under the head board, and don't drink or eat before going to bed and avoid lying down immediately after meals.  Avoid vaping/smoking/marijuana/THC.  Pantoprazole 20 mg 1 by mouth in the am 30 minutes before breakfast.  Zofran 4 mg 1 po every 8 hours as needed for nausea.  The patient should eat 4-5 very small meals throughout the day. Avoid large meals.  It is recommended to eat a softer diet. Meats are best consumed ground. Fruits and vegetables are best consumed cooked or steamed and then mashed.   Low fiber, low fat diet with liberal water intake. May use soluble fiber.  Metamucil 1 packet/scoop daily or fiber gummies 2-4 per day.   Low FODMAP diet - avoid all dairy. May use lactose free/dairy free alternatives such as almond milk, rice milk, oat milk, etc.   Miralax 17 grams daily for constipation.  May add on stool softeners 2 per day as needed for constipation.   Advised to exercise 30 minutes 4-5 days per week.   Colonoscopy for surveillance in May 2029.  Follow up: 6 months          Low-FODMAP Eating Plan    FODMAP stands for fermentable oligosaccharides, disaccharides, monosaccharides, and polyols. These are sugars that are hard for some people to digest. A low-FODMAP eating plan may help some people who have irritable bowel syndrome (IBS) and certain other bowel (intestinal) diseases to manage their symptoms.  This meal plan can be complicated to follow. Work with a diet and nutrition specialist (dietitian) to make a low-FODMAP eating plan that is right for you. A dietitian can help make sure that you get enough nutrition from this diet.  What are tips for following  this plan?  Reading food labels  Check labels for hidden FODMAPs such as:  High-fructose syrup.  Honey.  Agave.  Natural fruit flavors.  Onion or garlic powder.  Choose low-FODMAP foods that contain 3-4 grams of fiber per serving.  Check food labels for serving sizes. Eat only one serving at a time to make sure FODMAP levels stay low.  Shopping  Shop with a list of foods that are recommended on this diet and make a meal plan.  Meal planning  Follow a low-FODMAP eating plan for up to 6 weeks, or as told by your health care provider or dietitian.  To follow the eating plan:  Eliminate high-FODMAP foods from your diet completely. Choose only low-FODMAP foods to eat. You will do this for 2-6 weeks.  Gradually reintroduce high-FODMAP foods into your diet one at a time. Most people should wait a few days before introducing the next new high-FODMAP food into their meal plan. Your dietitian can recommend how quickly you may reintroduce foods.  Keep a daily record of what and how much you eat and drink. Make note of any symptoms that you have after eating.  Review your daily record with a dietitian regularly to identify which foods you can eat and which foods you should avoid.  General tips  Drink enough fluid each day to keep your urine pale yellow.  Avoid processed foods. These often have added sugar and may be high in FODMAPs.  Avoid most dairy products, whole grains, and sweeteners.  Work with a dietitian to make sure you get enough fiber in your diet.  Avoid high FODMAP foods at meals to manage symptoms.    Recommended foods  Fruits  Bananas, oranges, tangerines, garcia, limes, blueberries, raspberries, strawberries, grapes, cantaloupe, honeydew melon, kiwi, papaya, passion fruit, and pineapple. Limited amounts of dried cranberries, banana chips, and shredded coconut.  Vegetables  Eggplant, zucchini, cucumber, peppers, green beans, bean sprouts, lettuce, arugula, kale, Swiss chard, spinach, brandie greens, bok jhonny,  "summer squash, potato, and tomato. Limited amounts of corn, carrot, and sweet potato. Green parts of scallions.  Grains  Gluten-free grains, such as rice, oats, buckwheat, quinoa, corn, polenta, and millet. Gluten-free pasta, bread, or cereal. Rice noodles. Corn tortillas.  Meats and other proteins  Unseasoned beef, pork, poultry, or fish. Eggs. Roblero. Tofu (firm) and tempeh. Limited amounts of nuts and seeds, such as almonds, walnuts, brazil nuts, pecans, peanuts, nut butters, pumpkin seeds, lily seeds, and sunflower seeds.  Dairy  Lactose-free milk, yogurt, and kefir. Lactose-free cottage cheese and ice cream. Non-dairy milks, such as almond, coconut, hemp, and rice milk. Non-dairy yogurt. Limited amounts of goat cheese, brie, mozzarella, parmesan, swiss, and other hard cheeses.  Fats and oils  Butter-free spreads. Vegetable oils, such as olive, canola, and sunflower oil.  Seasoning and other foods  Artificial sweeteners with names that do not end in \"ol,\" such as aspartame, saccharine, and stevia. Maple syrup, white table sugar, raw sugar, brown sugar, and molasses. Mayonnaise, soy sauce, and tamari. Fresh basil, coriander, parsley, rosemary, and thyme.  Beverages  Water and mineral water. Sugar-sweetened soft drinks. Small amounts of orange juice or cranberry juice. Black and green tea. Most dry emiliano. Coffee.  The items listed above may not be a complete list of foods and beverages you can eat. Contact a dietitian for more information.    Foods to avoid  Fruits  Fresh, dried, and juiced forms of apple, pear, watermelon, peach, plum, cherries, apricots, blackberries, boysenberries, figs, nectarines, and binta. Avocado.  Vegetables  Chicory root, artichoke, asparagus, cabbage, snow peas, [place] sprouts, broccoli, sugar snap peas, mushrooms, celery, and cauliflower. Onions, garlic, leeks, and the white part of scallions.  Grains  Wheat, including kamut, durum, and semolina. Barley and bulgur. Couscous. " Wheat-based cereals. Wheat noodles, bread, crackers, and pastries.  Meats and other proteins  Fried or fatty meat. Sausage. Cashews and pistachios. Soybeans, baked beans, black beans, chickpeas, kidney beans, mehrdad beans, navy beans, lentils, black-eyed peas, and split peas.  Dairy  Milk, yogurt, ice cream, and soft cheese. Cream and sour cream. Milk-based sauces. Custard. Buttermilk. Soy milk.  Seasoning and other foods  Any sugar-free gum or candy. Foods that contain artificial sweeteners such as sorbitol, mannitol, isomalt, or xylitol. Foods that contain honey, high-fructose corn syrup, or agave. Bouillon, vegetable stock, beef stock, and chicken stock. Garlic and onion powder. Condiments made with onion, such as hummus, chutney, pickles, relish, salad dressing, and salsa. Tomato paste.  Beverages  Chicory-based drinks. Coffee substitutes. Chamomile tea. Fennel tea. Sweet or fortified emiliano such as port or selam. Diet soft drinks made with isomalt, mannitol, maltitol, sorbitol, or xylitol. Apple, pear, and binta juice. Juices with high-fructose corn syrup.  The items listed above may not be a complete list of foods and beverages you should avoid. Contact a dietitian for more information.    Summary  FODMAP stands for fermentable oligosaccharides, disaccharides, monosaccharides, and polyols. These are sugars that are hard for some people to digest.  A low-FODMAP eating plan is a short-term diet that helps to ease symptoms of certain bowel diseases.  The eating plan usually lasts up to 6 weeks. After that, high-FODMAP foods are reintroduced gradually and one at a time. This can help you find out which foods may be causing symptoms.  A low-FODMAP eating plan can be complicated. It is best to work with a dietitian who has experience with this type of plan.  This information is not intended to replace advice given to you by your health care provider. Make sure you discuss any questions you have with your health care  provider.  Document Revised: 05/06/2021 Document Reviewed: 05/06/2021  Elsevier Patient Education © 2023 [employer]    Joseph Saucedo, APRN  7/15/2024    Please note that portions of this note were completed with a voice recognition program.    Ketoconazole Pregnancy And Lactation Text: This medication is Pregnancy Category C and it isn't know if it is safe during pregnancy. It is also excreted in breast milk and breast feeding isn't recommended.

## 2024-07-15 NOTE — PATIENT INSTRUCTIONS
Antireflux measures: Avoid fried, fatty foods, alcohol, chocolate, coffee, tea,  soft drinks, all carbonated beverages (including sparkling water), peppermint and spearmint, spicy foods, tomatoes and tomato based foods, onions, peppers, and garlic.   Other antireflux measures include weight reduction if overweight, avoiding tight clothing around the abdomen, elevating the head of the bed 6 inches with blocks under the head board, and don't drink or eat before going to bed and avoid lying down immediately after meals.  Avoid vaping/smoking/marijuana/THC.  Pantoprazole 20 mg 1 by mouth in the am 30 minutes before breakfast.  Zofran 4 mg 1 po every 8 hours as needed for nausea.  The patient should eat 4-5 very small meals throughout the day. Avoid large meals.  It is recommended to eat a softer diet. Meats are best consumed ground. Fruits and vegetables are best consumed cooked or steamed and then mashed.   Low fiber, low fat diet with liberal water intake. May use soluble fiber.  Metamucil 1 packet/scoop daily or fiber gummies 2-4 per day.   Low FODMAP diet - avoid all dairy. May use lactose free/dairy free alternatives such as almond milk, rice milk, oat milk, etc.   Miralax 17 grams daily for constipation.  May add on stool softeners 2 per day as needed for constipation.   Advised to exercise 30 minutes 4-5 days per week.   Colonoscopy for surveillance in May 2029.  Follow up: 6 months          Low-FODMAP Eating Plan    FODMAP stands for fermentable oligosaccharides, disaccharides, monosaccharides, and polyols. These are sugars that are hard for some people to digest. A low-FODMAP eating plan may help some people who have irritable bowel syndrome (IBS) and certain other bowel (intestinal) diseases to manage their symptoms.  This meal plan can be complicated to follow. Work with a diet and nutrition specialist (dietitian) to make a low-FODMAP eating plan that is right for you. A dietitian can help make sure that you  get enough nutrition from this diet.  What are tips for following this plan?  Reading food labels  Check labels for hidden FODMAPs such as:  High-fructose syrup.  Honey.  Agave.  Natural fruit flavors.  Onion or garlic powder.  Choose low-FODMAP foods that contain 3-4 grams of fiber per serving.  Check food labels for serving sizes. Eat only one serving at a time to make sure FODMAP levels stay low.  Shopping  Shop with a list of foods that are recommended on this diet and make a meal plan.  Meal planning  Follow a low-FODMAP eating plan for up to 6 weeks, or as told by your health care provider or dietitian.  To follow the eating plan:  Eliminate high-FODMAP foods from your diet completely. Choose only low-FODMAP foods to eat. You will do this for 2-6 weeks.  Gradually reintroduce high-FODMAP foods into your diet one at a time. Most people should wait a few days before introducing the next new high-FODMAP food into their meal plan. Your dietitian can recommend how quickly you may reintroduce foods.  Keep a daily record of what and how much you eat and drink. Make note of any symptoms that you have after eating.  Review your daily record with a dietitian regularly to identify which foods you can eat and which foods you should avoid.  General tips  Drink enough fluid each day to keep your urine pale yellow.  Avoid processed foods. These often have added sugar and may be high in FODMAPs.  Avoid most dairy products, whole grains, and sweeteners.  Work with a dietitian to make sure you get enough fiber in your diet.  Avoid high FODMAP foods at meals to manage symptoms.    Recommended foods  Fruits  Bananas, oranges, tangerines, garcia, limes, blueberries, raspberries, strawberries, grapes, cantaloupe, honeydew melon, kiwi, papaya, passion fruit, and pineapple. Limited amounts of dried cranberries, banana chips, and shredded coconut.  Vegetables  Eggplant, zucchini, cucumber, peppers, green beans, bean sprouts, lettuce,  "arugula, kale, Swiss chard, spinach, brandie greens, bok jhonny, summer squash, potato, and tomato. Limited amounts of corn, carrot, and sweet potato. Green parts of scallions.  Grains  Gluten-free grains, such as rice, oats, buckwheat, quinoa, corn, polenta, and millet. Gluten-free pasta, bread, or cereal. Rice noodles. Corn tortillas.  Meats and other proteins  Unseasoned beef, pork, poultry, or fish. Eggs. Roblero. Tofu (firm) and tempeh. Limited amounts of nuts and seeds, such as almonds, walnuts, brazil nuts, pecans, peanuts, nut butters, pumpkin seeds, lily seeds, and sunflower seeds.  Dairy  Lactose-free milk, yogurt, and kefir. Lactose-free cottage cheese and ice cream. Non-dairy milks, such as almond, coconut, hemp, and rice milk. Non-dairy yogurt. Limited amounts of goat cheese, brie, mozzarella, parmesan, swiss, and other hard cheeses.  Fats and oils  Butter-free spreads. Vegetable oils, such as olive, canola, and sunflower oil.  Seasoning and other foods  Artificial sweeteners with names that do not end in \"ol,\" such as aspartame, saccharine, and stevia. Maple syrup, white table sugar, raw sugar, brown sugar, and molasses. Mayonnaise, soy sauce, and tamari. Fresh basil, coriander, parsley, rosemary, and thyme.  Beverages  Water and mineral water. Sugar-sweetened soft drinks. Small amounts of orange juice or cranberry juice. Black and green tea. Most dry emiliano. Coffee.  The items listed above may not be a complete list of foods and beverages you can eat. Contact a dietitian for more information.    Foods to avoid  Fruits  Fresh, dried, and juiced forms of apple, pear, watermelon, peach, plum, cherries, apricots, blackberries, boysenberries, figs, nectarines, and binta. Avocado.  Vegetables  Chicory root, artichoke, asparagus, cabbage, snow peas, [place] sprouts, broccoli, sugar snap peas, mushrooms, celery, and cauliflower. Onions, garlic, leeks, and the white part of scallions.  Grains  Wheat, including " kamut, durum, and semolina. Barley and bulgur. Couscous. Wheat-based cereals. Wheat noodles, bread, crackers, and pastries.  Meats and other proteins  Fried or fatty meat. Sausage. Cashews and pistachios. Soybeans, baked beans, black beans, chickpeas, kidney beans, mehrdad beans, navy beans, lentils, black-eyed peas, and split peas.  Dairy  Milk, yogurt, ice cream, and soft cheese. Cream and sour cream. Milk-based sauces. Custard. Buttermilk. Soy milk.  Seasoning and other foods  Any sugar-free gum or candy. Foods that contain artificial sweeteners such as sorbitol, mannitol, isomalt, or xylitol. Foods that contain honey, high-fructose corn syrup, or agave. Bouillon, vegetable stock, beef stock, and chicken stock. Garlic and onion powder. Condiments made with onion, such as hummus, chutney, pickles, relish, salad dressing, and salsa. Tomato paste.  Beverages  Chicory-based drinks. Coffee substitutes. Chamomile tea. Fennel tea. Sweet or fortified emiliano such as port or selam. Diet soft drinks made with isomalt, mannitol, maltitol, sorbitol, or xylitol. Apple, pear, and binta juice. Juices with high-fructose corn syrup.  The items listed above may not be a complete list of foods and beverages you should avoid. Contact a dietitian for more information.    Summary  FODMAP stands for fermentable oligosaccharides, disaccharides, monosaccharides, and polyols. These are sugars that are hard for some people to digest.  A low-FODMAP eating plan is a short-term diet that helps to ease symptoms of certain bowel diseases.  The eating plan usually lasts up to 6 weeks. After that, high-FODMAP foods are reintroduced gradually and one at a time. This can help you find out which foods may be causing symptoms.  A low-FODMAP eating plan can be complicated. It is best to work with a dietitian who has experience with this type of plan.  This information is not intended to replace advice given to you by your health care provider. Make sure  you discuss any questions you have with your health care provider.  Document Revised: 05/06/2021 Document Reviewed: 05/06/2021  Elsevier Patient Education © 2023 [employer]

## 2025-01-15 ENCOUNTER — OFFICE VISIT (OUTPATIENT)
Dept: GASTROENTEROLOGY | Facility: CLINIC | Age: 45
End: 2025-01-15
Payer: COMMERCIAL

## 2025-01-15 VITALS
BODY MASS INDEX: 23.96 KG/M2 | HEART RATE: 74 BPM | DIASTOLIC BLOOD PRESSURE: 76 MMHG | WEIGHT: 144 LBS | OXYGEN SATURATION: 98 % | SYSTOLIC BLOOD PRESSURE: 120 MMHG

## 2025-01-15 DIAGNOSIS — K21.9 GASTROESOPHAGEAL REFLUX DISEASE WITHOUT ESOPHAGITIS: Chronic | ICD-10-CM

## 2025-01-15 DIAGNOSIS — Z86.0100 PERSONAL HISTORY OF COLON POLYPS, UNSPECIFIED: ICD-10-CM

## 2025-01-15 DIAGNOSIS — K59.00 CONSTIPATION, UNSPECIFIED CONSTIPATION TYPE: Chronic | ICD-10-CM

## 2025-01-15 DIAGNOSIS — K31.84 GASTROPARESIS: ICD-10-CM

## 2025-01-15 DIAGNOSIS — K58.1 IRRITABLE BOWEL SYNDROME WITH CONSTIPATION: Chronic | ICD-10-CM

## 2025-01-15 DIAGNOSIS — K92.9 FUNCTIONAL GASTROINTESTINAL DISORDER: Primary | Chronic | ICD-10-CM

## 2025-01-15 PROCEDURE — 99214 OFFICE O/P EST MOD 30 MIN: CPT | Performed by: NURSE PRACTITIONER

## 2025-01-15 NOTE — PATIENT INSTRUCTIONS
Antireflux measures: Avoid fried, fatty foods, alcohol, chocolate, coffee, tea,  soft drinks, all carbonated beverages (including sparkling water), peppermint and spearmint, spicy foods, tomatoes and tomato based foods, onions, peppers, and garlic.   Other antireflux measures include weight reduction if overweight, avoiding tight clothing around the abdomen, elevating the head of the bed 6 inches with blocks under the head board, and don't drink or eat before going to bed and avoid lying down immediately after meals.  Avoid vaping/smoking/marijuana/THC.  Pantoprazole 20 mg 1 by mouth in the am 30 minutes before breakfast.  Zofran 4 mg 1 po every 8 hours as needed for nausea.  The patient should eat 4-5 very small meals throughout the day. Avoid large meals.  It is recommended to eat a softer diet. Meats are best consumed ground. Fruits and vegetables are best consumed cooked or steamed and then mashed.   Low fiber, low fat diet with liberal water intake. May use soluble fiber.  Metamucil 1 packet/scoop daily or fiber gummies/capsules 2-4 per day.   Low FODMAP diet - avoid all dairy. May use lactose free/dairy free alternatives such as almond milk, rice milk, oat milk, etc.   Linzess 72 mcg 1 po every morning on an empty stomach.   Miralax 17 grams daily for constipation as needed.  May add on stool softeners 2 per day as needed for constipation.   Advised to exercise 30 minutes 4-5 days per week.   Colonoscopy for surveillance in May 2029.  Labs  Follow up: 6 months          Low-FODMAP Eating Plan    FODMAP stands for fermentable oligosaccharides, disaccharides, monosaccharides, and polyols. These are sugars that are hard for some people to digest. A low-FODMAP eating plan may help some people who have irritable bowel syndrome (IBS) and certain other bowel (intestinal) diseases to manage their symptoms.  This meal plan can be complicated to follow. Work with a diet and nutrition specialist (dietitian) to make a  low-FODMAP eating plan that is right for you. A dietitian can help make sure that you get enough nutrition from this diet.  What are tips for following this plan?  Reading food labels  Check labels for hidden FODMAPs such as:  High-fructose syrup.  Honey.  Agave.  Natural fruit flavors.  Onion or garlic powder.  Choose low-FODMAP foods that contain 3-4 grams of fiber per serving.  Check food labels for serving sizes. Eat only one serving at a time to make sure FODMAP levels stay low.  Shopping  Shop with a list of foods that are recommended on this diet and make a meal plan.  Meal planning  Follow a low-FODMAP eating plan for up to 6 weeks, or as told by your health care provider or dietitian.  To follow the eating plan:  Eliminate high-FODMAP foods from your diet completely. Choose only low-FODMAP foods to eat. You will do this for 2-6 weeks.  Gradually reintroduce high-FODMAP foods into your diet one at a time. Most people should wait a few days before introducing the next new high-FODMAP food into their meal plan. Your dietitian can recommend how quickly you may reintroduce foods.  Keep a daily record of what and how much you eat and drink. Make note of any symptoms that you have after eating.  Review your daily record with a dietitian regularly to identify which foods you can eat and which foods you should avoid.  General tips  Drink enough fluid each day to keep your urine pale yellow.  Avoid processed foods. These often have added sugar and may be high in FODMAPs.  Avoid most dairy products, whole grains, and sweeteners.  Work with a dietitian to make sure you get enough fiber in your diet.  Avoid high FODMAP foods at meals to manage symptoms.     Recommended foods  Fruits  Bananas, oranges, tangerines, garcia, limes, blueberries, raspberries, strawberries, grapes, cantaloupe, honeydew melon, kiwi, papaya, passion fruit, and pineapple. Limited amounts of dried cranberries, banana chips, and shredded  "coconut.  Vegetables  Eggplant, zucchini, cucumber, peppers, green beans, bean sprouts, lettuce, arugula, kale, Swiss chard, spinach, brandie greens, bok jhonny, summer squash, potato, and tomato. Limited amounts of corn, carrot, and sweet potato. Green parts of scallions.  Grains  Gluten-free grains, such as rice, oats, buckwheat, quinoa, corn, polenta, and millet. Gluten-free pasta, bread, or cereal. Rice noodles. Corn tortillas.  Meats and other proteins  Unseasoned beef, pork, poultry, or fish. Eggs. Roblero. Tofu (firm) and tempeh. Limited amounts of nuts and seeds, such as almonds, walnuts, brazil nuts, pecans, peanuts, nut butters, pumpkin seeds, lily seeds, and sunflower seeds.  Dairy  Lactose-free milk, yogurt, and kefir. Lactose-free cottage cheese and ice cream. Non-dairy milks, such as almond, coconut, hemp, and rice milk. Non-dairy yogurt. Limited amounts of goat cheese, brie, mozzarella, parmesan, swiss, and other hard cheeses.  Fats and oils  Butter-free spreads. Vegetable oils, such as olive, canola, and sunflower oil.  Seasoning and other foods  Artificial sweeteners with names that do not end in \"ol,\" such as aspartame, saccharine, and stevia. Maple syrup, white table sugar, raw sugar, brown sugar, and molasses. Mayonnaise, soy sauce, and tamari. Fresh basil, coriander, parsley, rosemary, and thyme.  Beverages  Water and mineral water. Sugar-sweetened soft drinks. Small amounts of orange juice or cranberry juice. Black and green tea. Most dry emiliano. Coffee.  The items listed above may not be a complete list of foods and beverages you can eat. Contact a dietitian for more information.     Foods to avoid  Fruits  Fresh, dried, and juiced forms of apple, pear, watermelon, peach, plum, cherries, apricots, blackberries, boysenberries, figs, nectarines, and binta. Avocado.  Vegetables  Chicory root, artichoke, asparagus, cabbage, snow peas, [place] sprouts, broccoli, sugar snap peas, mushrooms, celery, and " cauliflower. Onions, garlic, leeks, and the white part of scallions.  Grains  Wheat, including kamut, durum, and semolina. Barley and bulgur. Couscous. Wheat-based cereals. Wheat noodles, bread, crackers, and pastries.  Meats and other proteins  Fried or fatty meat. Sausage. Cashews and pistachios. Soybeans, baked beans, black beans, chickpeas, kidney beans, mehrdad beans, navy beans, lentils, black-eyed peas, and split peas.  Dairy  Milk, yogurt, ice cream, and soft cheese. Cream and sour cream. Milk-based sauces. Custard. Buttermilk. Soy milk.  Seasoning and other foods  Any sugar-free gum or candy. Foods that contain artificial sweeteners such as sorbitol, mannitol, isomalt, or xylitol. Foods that contain honey, high-fructose corn syrup, or agave. Bouillon, vegetable stock, beef stock, and chicken stock. Garlic and onion powder. Condiments made with onion, such as hummus, chutney, pickles, relish, salad dressing, and salsa. Tomato paste.  Beverages  Chicory-based drinks. Coffee substitutes. Chamomile tea. Fennel tea. Sweet or fortified emiliano such as port or selam. Diet soft drinks made with isomalt, mannitol, maltitol, sorbitol, or xylitol. Apple, pear, and binta juice. Juices with high-fructose corn syrup.  The items listed above may not be a complete list of foods and beverages you should avoid. Contact a dietitian for more information.     Summary  FODMAP stands for fermentable oligosaccharides, disaccharides, monosaccharides, and polyols. These are sugars that are hard for some people to digest.  A low-FODMAP eating plan is a short-term diet that helps to ease symptoms of certain bowel diseases.  The eating plan usually lasts up to 6 weeks. After that, high-FODMAP foods are reintroduced gradually and one at a time. This can help you find out which foods may be causing symptoms.  A low-FODMAP eating plan can be complicated. It is best to work with a dietitian who has experience with this type of plan.  This  information is not intended to replace advice given to you by your health care provider. Make sure you discuss any questions you have with your health care provider.  Document Revised: 05/06/2021 Document Reviewed: 05/06/2021  [employer]vier Patient Education © 2023 [employer]

## 2025-01-15 NOTE — PROGRESS NOTES
Follow Up Note     Date: 01/15/2025   Patient Name: Lina Callahan  MRN: [id number]  : 1980     Primary Care Provider: Sarah Souza DO     Chief Complaint   Patient presents with    Functional gastrointestinal disorder     1/15/2025  History of Present Illness  The patient is a 44-year-old female here for a follow-up on functional gastrointestinal disorder.    She reports persistent constipation, despite attempts at management with stool softeners, MiraLAX, and fiber supplements. She has recently incorporated gummy laxatives into her regimen, consuming them 2 to 3 times weekly. She no longer experiences episodes of diarrhea but describes a pattern of several days without bowel movements, followed by a sudden urge to defecate, which she finds particularly disruptive to her work schedule. She also reports intermittent bloating lasting 2 to 3 days.     She continues to take pantoprazole 30 minutes before breakfast for reflux, which remains well-controlled. She reports experiencing heart palpitations and fluttering sensations, which she associates with burping. These symptoms are alleviated upon sitting up and belching. She does not consume carbonated beverages and has quit smoking. She uses gum as a substitute for cigarettes and frequently drinks through a straw. She also reports a sensation of air accumulation when lying down, which is relieved by sitting up and burping.    SOCIAL HISTORY  The patient has quit smoking and does not vape.     Interval History:  7/15/2024  Lina Callahan is a 43 y.o. female who is here today for follow up for functional GI disorder. Since being started on oral contraceptives, she has been having some constipation. She has increased her fiber but it caused diarrhea. She had more constipation since she was a child than diarrhea. She has ran out of Pantoprazole about a week ago and since then her reflux has returned. She was only taking it every other day. No  history of GI bleeding.      8/17/2021  She has a history of epigastric pain for the past year that has gradually worsened. Eating makes the pain worse. She has the pain 4-5 days per week. She has associated nausea that is gradually worsening as well. Eating makes nausea worse. She has vomiting once a month on average. No history of hematemesis. She had melena several months ago, but none since. She takes Ibuprofen at least once a month for menstrual pain and occasional headaches. She has been having reflux for the past few weeks that is severe. She has frequent burping. She has not had reflux in the past and is not taking anything for reflux.      She has diarrhea 3-4 times per month that can last all day and is associated with the pain and nausea. She has occasional lower abdominal cramping associated with the diarrhea. No history of rectal bleeding.      She has not had colonoscopy or EGD in the past. There is no family history of GI malignancy or IBD.    Subjective      Past Medical History:   Diagnosis Date    Anxiety     Arthritis     Back pain     COVID-19 vaccine series completed     Moderna x2    Ear piercing     Epigastric pain     GERD (gastroesophageal reflux disease)     Scoliosis     Seasonal allergies     Tattoos     Varicella     I was a kid and don't remember how old.     Past Surgical History:   Procedure Laterality Date    COLONOSCOPY N/A 05/14/2022    Procedure: COLONOSCOPY WITH COLD SNARE POLYPECTOMY AND BIOPSY;  Surgeon: Josette Cade MD;  Location: [facility] ENDOSCOPY;  Service: Gastroenterology;  Laterality: N/A;    D & C WITH SUCTION      DILATATION AND CURETTAGE      ENDOSCOPY N/A 02/28/2022    Procedure: ESOPHAGOGASTRODUODENOSCOPY WITH BIOPSY;  Surgeon: Josette Cade MD;  Location: [facility] ENDOSCOPY;  Service: Gastroenterology;  Laterality: N/A;    TUBAL ABDOMINAL LIGATION      WISDOM TOOTH EXTRACTION      x2     Family History   Problem Relation Age of Onset     GI problems Father     Breast cancer Maternal Grandmother         Cervical cancer    Breast cancer Maternal Aunt     Stroke Maternal Grandfather     Colon cancer Neg Hx     Liver cancer Neg Hx     Rectal cancer Neg Hx     Stomach cancer Neg Hx      Social History     Socioeconomic History    Marital status:    Tobacco Use    Smoking status: Former     Current packs/day: 0.50     Average packs/day: 0.5 packs/day for 0.5 years (0.3 ttl pk-yrs)     Types: Cigarettes, Electronic Cigarette    Smokeless tobacco: Never   Vaping Use    Vaping status: Former   Substance and Sexual Activity    Alcohol use: Not Currently    Drug use: Never    Sexual activity: Yes     Partners: Male     Birth control/protection: Tubal ligation, Pill, Surgical       Current Outpatient Medications:     pantoprazole (PROTONIX) 20 MG EC tablet, TAKE TAKE 1 TABLET BY MOUTH IN THE AM 30 MINUTES BEFORE BREAKFAST., Disp: 90 tablet, Rfl: 1    dicyclomine (BENTYL) 20 MG tablet, Take 1 tablet by mouth 3 (Three) Times a Day As Needed (abdominal pain and diarrhea). (Patient not taking: Reported on 1/15/2025), Disp: 30 tablet, Rfl: 1    Docusate Sodium 100 MG capsule, Take 1 tablet by mouth 2 (Two) Times a Day As Needed for Constipation. (Patient not taking: Reported on 1/15/2025), Disp: 60 tablet, Rfl: 3    Ginger, Zingiber officinalis, (GINGER PO), Take  by mouth As Needed. (Patient not taking: Reported on 1/15/2025), Disp: , Rfl:     linaclotide (Linzess) 72 MCG capsule capsule, Take 1 capsule by mouth Every Morning Before Breakfast., Disp: 30 capsule, Rfl: 5    metoclopramide (Reglan) 5 MG tablet, Take 1 tablet twice a day for 2 weeks, then hold for 2 weeks. (Patient not taking: Reported on 1/15/2025), Disp: 30 tablet, Rfl: 1    norethindrone (MICRONOR) 0.35 MG tablet, Take 1 tablet by mouth Daily., Disp: 28 tablet, Rfl: 12    ondansetron ODT (ZOFRAN-ODT) 4 MG disintegrating tablet, Place 1 tablet on the tongue Every 8 (Eight) Hours As Needed for  Nausea or Vomiting. (Patient not taking: Reported on 1/15/2025), Disp: 30 tablet, Rfl: 1    Polyethylene Glycol powder, Take 1 cap full (17 grams) in 8 oz of noncarbonated beverage and drink once daily (Patient not taking: Reported on 1/15/2025), Disp: 500 g, Rfl: 3    No Known Allergies    The following portions of the patient's history were reviewed and updated as appropriate: allergies, current medications, past family history, past medical history, past social history, past surgical history and problem list.  Objective     Physical Exam  Vitals and nursing note reviewed.   Constitutional:       General: She is not in acute distress.     Appearance: Normal appearance. She is well-developed.   HENT:      Head: Normocephalic and atraumatic.      Mouth/Throat:      Mouth: Mucous membranes are not pale, not dry and not cyanotic.   Eyes:      General: Lids are normal.   Neck:      Trachea: Trachea normal.   Cardiovascular:      Rate and Rhythm: Normal rate.   Pulmonary:      Effort: Pulmonary effort is normal. No respiratory distress.      Breath sounds: Normal breath sounds.   Abdominal:      Tenderness: There is no abdominal tenderness.   Skin:     General: Skin is warm and dry.   Neurological:      Mental Status: She is alert and oriented to person, place, and time.   Psychiatric:         Mood and Affect: Mood normal.         Speech: Speech normal.         Behavior: Behavior normal. Behavior is cooperative.       Vitals:    01/15/25 0937   BP: 120/76   Pulse: 74   SpO2: 98%   Weight: 65.3 kg (144 lb)     Body mass index is 23.96 kg/m².     Results Review:   I reviewed the patient's new clinical results.    CBC with Auto Diff (08/23/2023 17:37)  COMPREHENSIVE METABOLIC PANEL (08/23/2023 17:37)  TSH with Reflex FT4 (08/22/2023 16:12)      Abdominal ultrasound dated 6/23/2021   Unremarkable right upper quadrant ultrasound.     Nuclear medicine hepatobiliary scan dated 6/30/2021  Normal ejection fraction of 84%     CT  Abdomen Pelvis With Contrast     Result Date: 5/10/2022  1. Possible hydrosalpinx or ovoid ovarian cyst in the right lower quadrant. Endovaginal ultrasound could better evaluate. 2. Appendix not well seen.       XR Chest PA and Lateral     2/6/2023  No acute cardiopulmonary process.      CTAP wo contrast 8/23/2023  Urinary bladder wall thickening. Correlate with urinalysis.   Unenhanced liver is grossly unremarkable without suspicious focal abnormality. Unremarkable gallbladder. No definite biliary ductal dilatation.   There is no evidence of bowel obstruction. The appendix is not well-visualized but there is no significant inflammation in the right lower quadrant. No significant mesenteric inflammation.      KUB 9/11/2023  Nonspecific bowel gas pattern.      EGD dated 2/28/2022 per Dr. Cade  - Normal oropharynx.  - Z-line regular, 36 cm from the incisors.  - 1 cm hiatal hernia.  - No gross lesions in esophagus.  - Erythematous mucosa in the posterior wall of the stomach, antrum and prepyloric region of the stomach. Biopsied.  - A medium amount of food (residue) in the stomach.  - Normal duodenal bulb, first portion of the duodenum, second portion of the duodenum and third portion of the duodenum. Biopsied.  - Gastric views were inadequate due to food debris  - Findings suggestive of gastroparesis  -Small bowel biopsy unremarkable.  Gastric antrum biopsy with mild reactive gastropathy, no H. pylori.     Colonoscopy dated 5/14/2022 per Dr. Cade  - One 8 mm polyp in the cecum, removed with a cold snare. Resected and retrieved.  - One 4 to 5 mm polyp in the mid sigmoid colon, removed with a cold snare. Resected and retrieved.  - A few diminutive polyps at the recto-sigmoid colon and in the distal sigmoid colon, three of these removed with a cold snare. Resected and retrieved.  - Non-bleeding external and internal hemorrhoids.  - The examined portion of the ileum was normal. Biopsied.  - Biopsies were taken with  a cold forceps from the right colon, left colon and transverse colon for evaluation of microscopic colitis.  - No endoscopic signs of colitis or ileitis  - Patient likely has functional GI disorder  A.   SIGMOID COLON POLYPS:   Hyperplastic polyps   B.   TERMINAL ILEUM BIOPSY:   Small intestinal mucosa with no significant histopathologic abnormalities   C.   CECUM POLYP:   Sessile serrated adenoma   Negative for cytologic dysplasia   D.   COLON, BIOPSIES:   Colonic type mucosa with no significant histopathologic abnormalities       Assessment / Plan      1. Functional gastrointestinal disorder  2. Irritable bowel syndrome with constipation  3. Constipation, unspecified constipation type  4. Gastroesophageal reflux disease without esophagitis  5. Gastroparesis  She has been taking MiraLAX, stool softeners and increased fiber and still has some constipation.  She has not had any further episodes of diarrhea.  Reflux reasonably controlled with low dose PPI daily. She has been having increased burping, likely due to chewing gum since quitting smoking and drinking through straws throughout the day with her water bottle. No difficulty swallowing.  Denies any GI bleeding.  Weight is stable. TSH normal.  Celiac panel negative.  MARIUM negative. B12 level normal.  Abdominal ultrasound unremarkable.  CTAP with unremarkable GI tract. EGD with findings suggestive of gastroparesis, biopsies unremarkable.  Colonoscopy with polyps removed, otherwise unremarkable.  No endoscopic signs of colitis or ileitis. Hepatitis panel negative. HIV negative. TB test negative. Likely functional gastrointestinal disorder.       Anti-reflux measures.   Continue Pantoprazole 20 mg daily/every other day.   Discussed avoiding straws and chewing gum.  She will avoid straws, but does not want to stop chewing gum as she is using this to avoid smoking.  Zofran as needed for nausea.   Advised to eat a softer diet 4-5 very small meals throughout the  day.  Low-fiber, low-fat diet with liberal water intake. May use soluble fiber.  Metamucil or fiber Gummies daily.  Low FODMAP diet-avoid all dairy  Linzess 72 mcg 1 p.o. daily.  May continue MiraLAX and stool softeners as needed.  Labs    - linaclotide (Linzess) 72 MCG capsule capsule; Take 1 capsule by mouth Every Morning Before Breakfast.  Dispense: 30 capsule; Refill: 5  - CBC (No Diff); Future  - Comprehensive Metabolic Panel; Future  - TSH; Future    6. Personal history of colon polyps, unspecified  Colonoscopy 5/14/2022 with polyps removed, 1 sessile serrated adenoma without dysplasia.  No family history of colon cancer.   Colonoscopy for surveillance in 7 years, 2029, per current [facility] guidelines.    Patient Instructions   Antireflux measures: Avoid fried, fatty foods, alcohol, chocolate, coffee, tea,  soft drinks, all carbonated beverages (including sparkling water), peppermint and spearmint, spicy foods, tomatoes and tomato based foods, onions, peppers, and garlic.   Other antireflux measures include weight reduction if overweight, avoiding tight clothing around the abdomen, elevating the head of the bed 6 inches with blocks under the head board, and don't drink or eat before going to bed and avoid lying down immediately after meals.  Avoid vaping/smoking/marijuana/THC.  Pantoprazole 20 mg 1 by mouth in the am 30 minutes before breakfast.  Zofran 4 mg 1 po every 8 hours as needed for nausea.  The patient should eat 4-5 very small meals throughout the day. Avoid large meals.  It is recommended to eat a softer diet. Meats are best consumed ground. Fruits and vegetables are best consumed cooked or steamed and then mashed.   Low fiber, low fat diet with liberal water intake. May use soluble fiber.  Metamucil 1 packet/scoop daily or fiber gummies/capsules 2-4 per day.   Low FODMAP diet - avoid all dairy. May use lactose free/dairy free alternatives such as almond milk, rice milk, oat milk, etc.   Linzess 72 mcg 1  po every morning on an empty stomach.   Miralax 17 grams daily for constipation as needed.  May add on stool softeners 2 per day as needed for constipation.   Advised to exercise 30 minutes 4-5 days per week.   Colonoscopy for surveillance in May 2029.  Labs  Follow up: 6 months          Low-FODMAP Eating Plan    FODMAP stands for fermentable oligosaccharides, disaccharides, monosaccharides, and polyols. These are sugars that are hard for some people to digest. A low-FODMAP eating plan may help some people who have irritable bowel syndrome (IBS) and certain other bowel (intestinal) diseases to manage their symptoms.  This meal plan can be complicated to follow. Work with a diet and nutrition specialist (dietitian) to make a low-FODMAP eating plan that is right for you. A dietitian can help make sure that you get enough nutrition from this diet.  What are tips for following this plan?  Reading food labels  Check labels for hidden FODMAPs such as:  High-fructose syrup.  Honey.  Agave.  Natural fruit flavors.  Onion or garlic powder.  Choose low-FODMAP foods that contain 3-4 grams of fiber per serving.  Check food labels for serving sizes. Eat only one serving at a time to make sure FODMAP levels stay low.  Shopping  Shop with a list of foods that are recommended on this diet and make a meal plan.  Meal planning  Follow a low-FODMAP eating plan for up to 6 weeks, or as told by your health care provider or dietitian.  To follow the eating plan:  Eliminate high-FODMAP foods from your diet completely. Choose only low-FODMAP foods to eat. You will do this for 2-6 weeks.  Gradually reintroduce high-FODMAP foods into your diet one at a time. Most people should wait a few days before introducing the next new high-FODMAP food into their meal plan. Your dietitian can recommend how quickly you may reintroduce foods.  Keep a daily record of what and how much you eat and drink. Make note of any symptoms that you have after  "eating.  Review your daily record with a dietitian regularly to identify which foods you can eat and which foods you should avoid.  General tips  Drink enough fluid each day to keep your urine pale yellow.  Avoid processed foods. These often have added sugar and may be high in FODMAPs.  Avoid most dairy products, whole grains, and sweeteners.  Work with a dietitian to make sure you get enough fiber in your diet.  Avoid high FODMAP foods at meals to manage symptoms.     Recommended foods  Fruits  Bananas, oranges, tangerines, garcia, limes, blueberries, raspberries, strawberries, grapes, cantaloupe, honeydew melon, kiwi, papaya, passion fruit, and pineapple. Limited amounts of dried cranberries, banana chips, and shredded coconut.  Vegetables  Eggplant, zucchini, cucumber, peppers, green beans, bean sprouts, lettuce, arugula, kale, Swiss chard, spinach, brandie greens, bok jhonny, summer squash, potato, and tomato. Limited amounts of corn, carrot, and sweet potato. Green parts of scallions.  Grains  Gluten-free grains, such as rice, oats, buckwheat, quinoa, corn, polenta, and millet. Gluten-free pasta, bread, or cereal. Rice noodles. Corn tortillas.  Meats and other proteins  Unseasoned beef, pork, poultry, or fish. Eggs. Roblero. Tofu (firm) and tempeh. Limited amounts of nuts and seeds, such as almonds, walnuts, brazil nuts, pecans, peanuts, nut butters, pumpkin seeds, lily seeds, and sunflower seeds.  Dairy  Lactose-free milk, yogurt, and kefir. Lactose-free cottage cheese and ice cream. Non-dairy milks, such as almond, coconut, hemp, and rice milk. Non-dairy yogurt. Limited amounts of goat cheese, brie, mozzarella, parmesan, swiss, and other hard cheeses.  Fats and oils  Butter-free spreads. Vegetable oils, such as olive, canola, and sunflower oil.  Seasoning and other foods  Artificial sweeteners with names that do not end in \"ol,\" such as aspartame, saccharine, and stevia. Maple syrup, white table sugar, raw sugar, " brown sugar, and molasses. Mayonnaise, soy sauce, and tamari. Fresh basil, coriander, parsley, rosemary, and thyme.  Beverages  Water and mineral water. Sugar-sweetened soft drinks. Small amounts of orange juice or cranberry juice. Black and green tea. Most dry emiliano. Coffee.  The items listed above may not be a complete list of foods and beverages you can eat. Contact a dietitian for more information.     Foods to avoid  Fruits  Fresh, dried, and juiced forms of apple, pear, watermelon, peach, plum, cherries, apricots, blackberries, boysenberries, figs, nectarines, and binta. Avocado.  Vegetables  Chicory root, artichoke, asparagus, cabbage, snow peas, [place] sprouts, broccoli, sugar snap peas, mushrooms, celery, and cauliflower. Onions, garlic, leeks, and the white part of scallions.  Grains  Wheat, including kamut, durum, and semolina. Barley and bulgur. Couscous. Wheat-based cereals. Wheat noodles, bread, crackers, and pastries.  Meats and other proteins  Fried or fatty meat. Sausage. Cashews and pistachios. Soybeans, baked beans, black beans, chickpeas, kidney beans, mehrdad beans, navy beans, lentils, black-eyed peas, and split peas.  Dairy  Milk, yogurt, ice cream, and soft cheese. Cream and sour cream. Milk-based sauces. Custard. Buttermilk. Soy milk.  Seasoning and other foods  Any sugar-free gum or candy. Foods that contain artificial sweeteners such as sorbitol, mannitol, isomalt, or xylitol. Foods that contain honey, high-fructose corn syrup, or agave. Bouillon, vegetable stock, beef stock, and chicken stock. Garlic and onion powder. Condiments made with onion, such as hummus, chutney, pickles, relish, salad dressing, and salsa. Tomato paste.  Beverages  Chicory-based drinks. Coffee substitutes. Chamomile tea. Fennel tea. Sweet or fortified emiliano such as port or selam. Diet soft drinks made with isomalt, mannitol, maltitol, sorbitol, or xylitol. Apple, pear, and binta juice. Juices with high-fructose  corn syrup.  The items listed above may not be a complete list of foods and beverages you should avoid. Contact a dietitian for more information.     Summary  FODMAP stands for fermentable oligosaccharides, disaccharides, monosaccharides, and polyols. These are sugars that are hard for some people to digest.  A low-FODMAP eating plan is a short-term diet that helps to ease symptoms of certain bowel diseases.  The eating plan usually lasts up to 6 weeks. After that, high-FODMAP foods are reintroduced gradually and one at a time. This can help you find out which foods may be causing symptoms.  A low-FODMAP eating plan can be complicated. It is best to work with a dietitian who has experience with this type of plan.  This information is not intended to replace advice given to you by your health care provider. Make sure you discuss any questions you have with your health care provider.  Document Revised: 05/06/2021 Document Reviewed: 05/06/2021  [employer] Patient Education © 2023 [employer]     TERI Luis  1/15/2025    Please note that portions of this note were completed with a voice recognition program.     Patient or patient representative verbalized consent for the use of Ambient Listening during the visit with  TERI Luis for chart documentation. 1/15/2025  09:58 EST

## 2025-04-02 ENCOUNTER — TRANSCRIBE ORDERS (OUTPATIENT)
Dept: ADMINISTRATIVE | Facility: HOSPITAL | Age: 45
End: 2025-04-02
Payer: COMMERCIAL

## 2025-04-02 DIAGNOSIS — Z12.31 SCREENING MAMMOGRAM FOR BREAST CANCER: Primary | ICD-10-CM

## (undated) DEVICE — QUICK CATCH IN-LINE SUCTION POLYP TRAP IS USED FOR SUCTION RETRIEVAL OF ENDOSCOPICALLY REMOVED POLYPS.

## (undated) DEVICE — CONMED SCOPE SAVER BITE BLOCK, 20X27 MM: Brand: SCOPE SAVER

## (undated) DEVICE — HYBRID TUBING/CAP SET FOR OLYMPUS® SCOPES: Brand: ERBE

## (undated) DEVICE — Device

## (undated) DEVICE — ENDOSCOPY PORT CONNECTOR FOR OLYMPUS® SCOPES: Brand: ERBE

## (undated) DEVICE — LUBE JELLY PK/2.75GM STRL BX/144

## (undated) DEVICE — FRCP BX RADJAW4 NDL 2.8 240 STD OG

## (undated) DEVICE — VLV SXN AIR/H2O ORCAPOD3 1P/U STRL

## (undated) DEVICE — SUCTION CANISTER, 1500CC, RIGID: Brand: DEROYAL

## (undated) DEVICE — SINGLE-USE POLYPECTOMY SNARE: Brand: CAPTIVATOR II